# Patient Record
Sex: FEMALE | Race: WHITE
[De-identification: names, ages, dates, MRNs, and addresses within clinical notes are randomized per-mention and may not be internally consistent; named-entity substitution may affect disease eponyms.]

---

## 2019-09-10 ENCOUNTER — HOSPITAL ENCOUNTER (INPATIENT)
Dept: HOSPITAL 63 - GEROPSY | Age: 64
Discharge: NURSING, REHAB OR LONG-TERM CARE | DRG: 885 | End: 2019-09-10
Attending: PSYCHIATRY & NEUROLOGY | Admitting: PSYCHIATRY & NEUROLOGY
Payer: MEDICARE

## 2019-09-10 ENCOUNTER — HOSPITAL ENCOUNTER (INPATIENT)
Dept: HOSPITAL 63 - ICU | Age: 64
LOS: 2 days | Discharge: PSYCHIATRIC OR SUBSTANCE USE FACILITY | DRG: 314 | End: 2019-09-12
Attending: INTERNAL MEDICINE | Admitting: INTERNAL MEDICINE
Payer: MEDICARE

## 2019-09-10 VITALS — HEIGHT: 63 IN | WEIGHT: 95.56 LBS | BODY MASS INDEX: 16.93 KG/M2

## 2019-09-10 VITALS — SYSTOLIC BLOOD PRESSURE: 91 MMHG | DIASTOLIC BLOOD PRESSURE: 66 MMHG

## 2019-09-10 VITALS — SYSTOLIC BLOOD PRESSURE: 79 MMHG | DIASTOLIC BLOOD PRESSURE: 55 MMHG

## 2019-09-10 VITALS — DIASTOLIC BLOOD PRESSURE: 72 MMHG | SYSTOLIC BLOOD PRESSURE: 79 MMHG

## 2019-09-10 VITALS — HEIGHT: 63 IN | BODY MASS INDEX: 16.17 KG/M2 | WEIGHT: 91.25 LBS

## 2019-09-10 DIAGNOSIS — E87.2: ICD-10-CM

## 2019-09-10 DIAGNOSIS — I95.9: Primary | ICD-10-CM

## 2019-09-10 DIAGNOSIS — F41.9: ICD-10-CM

## 2019-09-10 DIAGNOSIS — E86.0: ICD-10-CM

## 2019-09-10 DIAGNOSIS — M87.9: ICD-10-CM

## 2019-09-10 DIAGNOSIS — Z87.891: ICD-10-CM

## 2019-09-10 DIAGNOSIS — Z81.1: ICD-10-CM

## 2019-09-10 DIAGNOSIS — Z79.899: ICD-10-CM

## 2019-09-10 DIAGNOSIS — F29: ICD-10-CM

## 2019-09-10 DIAGNOSIS — E78.5: ICD-10-CM

## 2019-09-10 DIAGNOSIS — E87.6: ICD-10-CM

## 2019-09-10 DIAGNOSIS — N17.9: ICD-10-CM

## 2019-09-10 DIAGNOSIS — F11.10: ICD-10-CM

## 2019-09-10 DIAGNOSIS — F33.2: ICD-10-CM

## 2019-09-10 DIAGNOSIS — Z83.3: ICD-10-CM

## 2019-09-10 DIAGNOSIS — E87.1: ICD-10-CM

## 2019-09-10 DIAGNOSIS — E83.42: ICD-10-CM

## 2019-09-10 DIAGNOSIS — F22: ICD-10-CM

## 2019-09-10 DIAGNOSIS — E43: ICD-10-CM

## 2019-09-10 DIAGNOSIS — G31.84: ICD-10-CM

## 2019-09-10 DIAGNOSIS — Z86.010: ICD-10-CM

## 2019-09-10 DIAGNOSIS — I10: ICD-10-CM

## 2019-09-10 DIAGNOSIS — K21.9: ICD-10-CM

## 2019-09-10 DIAGNOSIS — H40.9: ICD-10-CM

## 2019-09-10 DIAGNOSIS — F09: ICD-10-CM

## 2019-09-10 DIAGNOSIS — J44.9: ICD-10-CM

## 2019-09-10 DIAGNOSIS — Z88.0: ICD-10-CM

## 2019-09-10 DIAGNOSIS — F32.3: Primary | ICD-10-CM

## 2019-09-10 LAB
ALBUMIN SERPL-MCNC: 3.5 G/DL (ref 3.4–5)
ALBUMIN/GLOB SERPL: 1 {RATIO} (ref 1–1.7)
ALP SERPL-CCNC: 79 U/L (ref 46–116)
ALT SERPL-CCNC: 6 U/L (ref 14–59)
ANION GAP SERPL CALC-SCNC: 16 MMOL/L (ref 6–14)
AST SERPL-CCNC: 14 U/L (ref 15–37)
BASOPHILS # BLD AUTO: 0 X10^3/UL (ref 0–0.2)
BASOPHILS NFR BLD: 1 % (ref 0–3)
BILIRUB SERPL-MCNC: 0.6 MG/DL (ref 0.2–1)
BUN/CREAT SERPL: 7 (ref 6–20)
CA-I SERPL ISE-MCNC: 11 MG/DL (ref 7–20)
CALCIUM SERPL-MCNC: 9.2 MG/DL (ref 8.5–10.1)
CHLORIDE SERPL-SCNC: 101 MMOL/L (ref 98–107)
CO2 SERPL-SCNC: 22 MMOL/L (ref 21–32)
CREAT SERPL-MCNC: 1.5 MG/DL (ref 0.6–1)
EOSINOPHIL NFR BLD: 0 X10^3/UL (ref 0–0.7)
EOSINOPHIL NFR BLD: 1 % (ref 0–3)
ERYTHROCYTE [DISTWIDTH] IN BLOOD BY AUTOMATED COUNT: 12.2 % (ref 11.5–14.5)
GFR SERPLBLD BASED ON 1.73 SQ M-ARVRAT: 34.9 ML/MIN
GLOBULIN SER-MCNC: 3.6 G/DL (ref 2.2–3.8)
GLUCOSE SERPL-MCNC: 164 MG/DL (ref 70–99)
HCT VFR BLD CALC: 48.1 % (ref 36–47)
HGB BLD-MCNC: 16 G/DL (ref 12–15.5)
LYMPHOCYTES # BLD: 2 X10^3/UL (ref 1–4.8)
LYMPHOCYTES NFR BLD AUTO: 37 % (ref 24–48)
MCH RBC QN AUTO: 30 PG (ref 25–35)
MCHC RBC AUTO-ENTMCNC: 33 G/DL (ref 31–37)
MCV RBC AUTO: 90 FL (ref 79–100)
MONO #: 0.3 X10^3/UL (ref 0–1.1)
MONOCYTES NFR BLD: 5 % (ref 0–9)
NEUT #: 3 X10^3UL (ref 1.8–7.7)
NEUTROPHILS NFR BLD AUTO: 57 % (ref 31–73)
PLATELET # BLD AUTO: 227 X10^3/UL (ref 140–400)
POTASSIUM SERPL-SCNC: 3.1 MMOL/L (ref 3.5–5.1)
PROT SERPL-MCNC: 7.1 G/DL (ref 6.4–8.2)
RBC # BLD AUTO: 5.33 X10^6/UL (ref 3.5–5.4)
SODIUM SERPL-SCNC: 139 MMOL/L (ref 136–145)
WBC # BLD AUTO: 5.4 X10^3/UL (ref 4–11)

## 2019-09-10 PROCEDURE — 80053 COMPREHEN METABOLIC PANEL: CPT

## 2019-09-10 PROCEDURE — 80048 BASIC METABOLIC PNL TOTAL CA: CPT

## 2019-09-10 PROCEDURE — 85027 COMPLETE CBC AUTOMATED: CPT

## 2019-09-10 PROCEDURE — 85730 THROMBOPLASTIN TIME PARTIAL: CPT

## 2019-09-10 PROCEDURE — 87641 MR-STAPH DNA AMP PROBE: CPT

## 2019-09-10 PROCEDURE — 87040 BLOOD CULTURE FOR BACTERIA: CPT

## 2019-09-10 PROCEDURE — 71045 X-RAY EXAM CHEST 1 VIEW: CPT

## 2019-09-10 PROCEDURE — 81001 URINALYSIS AUTO W/SCOPE: CPT

## 2019-09-10 PROCEDURE — 85610 PROTHROMBIN TIME: CPT

## 2019-09-10 PROCEDURE — 82550 ASSAY OF CK (CPK): CPT

## 2019-09-10 PROCEDURE — 85025 COMPLETE CBC W/AUTO DIFF WBC: CPT

## 2019-09-10 PROCEDURE — 36415 COLL VENOUS BLD VENIPUNCTURE: CPT

## 2019-09-10 PROCEDURE — 83735 ASSAY OF MAGNESIUM: CPT

## 2019-09-10 PROCEDURE — 93005 ELECTROCARDIOGRAM TRACING: CPT

## 2019-09-10 PROCEDURE — 83605 ASSAY OF LACTIC ACID: CPT

## 2019-09-10 NOTE — PDOC
Exam


Note:


Ashish Note:


Please also refer to the separate dictated note~for this date of service 

dictated separately. Discussed the patient with Nursing staff reviewed the 

chart.~Reviewed interim history and current functioning. Reviewed vital 

signs,~Labs/ Radiology~and current medications noted below. Continue current 

treatment with the changes noted in the dictated addendum note





Assessment:


Vital Signs/I&O:





                                   Vital Signs








  Date Time  Temp Pulse Resp B/P (MAP) Pulse Ox O2 Delivery O2 Flow Rate FiO2


 


9/10/19 20:03 97.5 115 20 91/66 (74) 96   








Labs:





                                Laboratory Tests








Test


 9/10/19


20:15


 


White Blood Count


 5.4 x10^3/uL


(4.0-11.0)


 


Red Blood Count


 5.33 x10^6/uL


(3.50-5.40)


 


Hemoglobin


 16.0 g/dL


(12.0-15.5)  H


 


Hematocrit


 48.1 %


(36.0-47.0)  H


 


Mean Corpuscular Volume


 90 fL ()





 


Mean Corpuscular Hemoglobin 30 pg (25-35)  


 


Mean Corpuscular Hemoglobin


Concent 33 g/dL


(31-37)


 


Red Cell Distribution Width


 12.2 %


(11.5-14.5)


 


Platelet Count


 227 x10^3/uL


(140-400)


 


Neutrophils (%) (Auto) 57 % (31-73)  


 


Lymphocytes (%) (Auto) 37 % (24-48)  


 


Monocytes (%) (Auto) 5 % (0-9)  


 


Eosinophils (%) (Auto) 1 % (0-3)  


 


Basophils (%) (Auto) 1 % (0-3)  


 


Neutrophils # (Auto)


 3.0 x10^3uL


(1.8-7.7)


 


Lymphocytes # (Auto)


 2.0 x10^3/uL


(1.0-4.8)


 


Monocytes # (Auto)


 0.3 x10^3/uL


(0.0-1.1)


 


Eosinophils # (Auto)


 0.0 x10^3/uL


(0.0-0.7)


 


Basophils # (Auto)


 0.0 x10^3/uL


(0.0-0.2)


 


Sodium Level


 139 mmol/L


(136-145)


 


Potassium Level


 3.1 mmol/L


(3.5-5.1)  L


 


Chloride Level


 101 mmol/L


()


 


Carbon Dioxide Level


 22 mmol/L


(21-32)


 


Anion Gap 16 (6-14)  H


 


Blood Urea Nitrogen


 11 mg/dL


(7-20)


 


Creatinine


 1.5 mg/dL


(0.6-1.0)  H


 


Estimated GFR


(Cockcroft-Gault) 34.9  





 


BUN/Creatinine Ratio 7 (6-20)  


 


Glucose Level


 164 mg/dL


(70-99)  H


 


Lactic Acid Level


 6.4 mmol/L


(0.4-2.0)  *H


 


Calcium Level


 9.2 mg/dL


(8.5-10.1)


 


Total Bilirubin


 0.6 mg/dL


(0.2-1.0)


 


Aspartate Amino Transferase


(AST) 14 U/L (15-37)


L


 


Alanine Aminotransferase (ALT)


 6 U/L (14-59)


L


 


Alkaline Phosphatase


 79 U/L


()


 


Creatine Kinase


 23 U/L


()  L


 


Total Protein


 7.1 g/dL


(6.4-8.2)


 


Albumin


 3.5 g/dL


(3.4-5.0)


 


Albumin/Globulin Ratio 1.0 (1.0-1.7)  











Current Medications:


I have reviewed the current psychotropics carefully including drug interactions.

 Risk benefit ratio favors no change other than as noted in my dictated progress

note.











TERA JAY MD                 Sep 10, 2019 21:49

## 2019-09-11 VITALS — SYSTOLIC BLOOD PRESSURE: 96 MMHG | DIASTOLIC BLOOD PRESSURE: 43 MMHG

## 2019-09-11 VITALS — DIASTOLIC BLOOD PRESSURE: 64 MMHG | SYSTOLIC BLOOD PRESSURE: 87 MMHG

## 2019-09-11 VITALS — DIASTOLIC BLOOD PRESSURE: 68 MMHG | SYSTOLIC BLOOD PRESSURE: 102 MMHG

## 2019-09-11 VITALS — DIASTOLIC BLOOD PRESSURE: 70 MMHG | SYSTOLIC BLOOD PRESSURE: 106 MMHG

## 2019-09-11 VITALS — SYSTOLIC BLOOD PRESSURE: 67 MMHG | DIASTOLIC BLOOD PRESSURE: 49 MMHG

## 2019-09-11 VITALS — DIASTOLIC BLOOD PRESSURE: 64 MMHG | SYSTOLIC BLOOD PRESSURE: 103 MMHG

## 2019-09-11 VITALS — SYSTOLIC BLOOD PRESSURE: 92 MMHG | DIASTOLIC BLOOD PRESSURE: 62 MMHG

## 2019-09-11 VITALS — DIASTOLIC BLOOD PRESSURE: 67 MMHG | SYSTOLIC BLOOD PRESSURE: 104 MMHG

## 2019-09-11 VITALS — SYSTOLIC BLOOD PRESSURE: 70 MMHG | DIASTOLIC BLOOD PRESSURE: 50 MMHG

## 2019-09-11 VITALS — SYSTOLIC BLOOD PRESSURE: 106 MMHG | DIASTOLIC BLOOD PRESSURE: 70 MMHG

## 2019-09-11 VITALS — DIASTOLIC BLOOD PRESSURE: 65 MMHG | SYSTOLIC BLOOD PRESSURE: 94 MMHG

## 2019-09-11 VITALS — DIASTOLIC BLOOD PRESSURE: 58 MMHG | SYSTOLIC BLOOD PRESSURE: 79 MMHG

## 2019-09-11 VITALS — DIASTOLIC BLOOD PRESSURE: 62 MMHG | SYSTOLIC BLOOD PRESSURE: 92 MMHG

## 2019-09-11 VITALS — SYSTOLIC BLOOD PRESSURE: 83 MMHG | DIASTOLIC BLOOD PRESSURE: 61 MMHG

## 2019-09-11 VITALS — DIASTOLIC BLOOD PRESSURE: 64 MMHG | SYSTOLIC BLOOD PRESSURE: 92 MMHG

## 2019-09-11 VITALS — SYSTOLIC BLOOD PRESSURE: 94 MMHG | DIASTOLIC BLOOD PRESSURE: 65 MMHG

## 2019-09-11 VITALS — DIASTOLIC BLOOD PRESSURE: 64 MMHG | SYSTOLIC BLOOD PRESSURE: 98 MMHG

## 2019-09-11 VITALS — SYSTOLIC BLOOD PRESSURE: 97 MMHG | DIASTOLIC BLOOD PRESSURE: 65 MMHG

## 2019-09-11 VITALS — DIASTOLIC BLOOD PRESSURE: 64 MMHG | SYSTOLIC BLOOD PRESSURE: 96 MMHG

## 2019-09-11 VITALS — SYSTOLIC BLOOD PRESSURE: 112 MMHG | DIASTOLIC BLOOD PRESSURE: 72 MMHG

## 2019-09-11 VITALS — DIASTOLIC BLOOD PRESSURE: 69 MMHG | SYSTOLIC BLOOD PRESSURE: 95 MMHG

## 2019-09-11 VITALS — SYSTOLIC BLOOD PRESSURE: 95 MMHG | DIASTOLIC BLOOD PRESSURE: 62 MMHG

## 2019-09-11 VITALS — DIASTOLIC BLOOD PRESSURE: 61 MMHG | SYSTOLIC BLOOD PRESSURE: 96 MMHG

## 2019-09-11 VITALS — DIASTOLIC BLOOD PRESSURE: 51 MMHG | SYSTOLIC BLOOD PRESSURE: 71 MMHG

## 2019-09-11 VITALS — DIASTOLIC BLOOD PRESSURE: 72 MMHG | SYSTOLIC BLOOD PRESSURE: 111 MMHG

## 2019-09-11 LAB
ALBUMIN SERPL-MCNC: 2.4 G/DL (ref 3.4–5)
ALBUMIN/GLOB SERPL: 0.9 {RATIO} (ref 1–1.7)
ALP SERPL-CCNC: 59 U/L (ref 46–116)
ALT SERPL-CCNC: 9 U/L (ref 14–59)
ANION GAP SERPL CALC-SCNC: 11 MMOL/L (ref 6–14)
APTT PPP: YELLOW S
AST SERPL-CCNC: 15 U/L (ref 15–37)
BACTERIA #/AREA URNS HPF: 0 /HPF
BASOPHILS # BLD AUTO: 0 X10^3/UL (ref 0–0.2)
BASOPHILS NFR BLD: 1 % (ref 0–3)
BILIRUB SERPL-MCNC: 0.2 MG/DL (ref 0.2–1)
BILIRUB UR QL STRIP: (no result)
BUN/CREAT SERPL: 10 (ref 6–20)
CA-I SERPL ISE-MCNC: 9 MG/DL (ref 7–20)
CALCIUM SERPL-MCNC: 7.8 MG/DL (ref 8.5–10.1)
CHLORIDE SERPL-SCNC: 112 MMOL/L (ref 98–107)
CO2 SERPL-SCNC: 20 MMOL/L (ref 21–32)
CREAT SERPL-MCNC: 0.9 MG/DL (ref 0.6–1)
EOSINOPHIL NFR BLD: 0.1 X10^3/UL (ref 0–0.7)
EOSINOPHIL NFR BLD: 1 % (ref 0–3)
ERYTHROCYTE [DISTWIDTH] IN BLOOD BY AUTOMATED COUNT: 12.3 % (ref 11.5–14.5)
FIBRINOGEN PPP-MCNC: CLEAR MG/DL
GFR SERPLBLD BASED ON 1.73 SQ M-ARVRAT: 62.8 ML/MIN
GLOBULIN SER-MCNC: 2.6 G/DL (ref 2.2–3.8)
GLUCOSE SERPL-MCNC: 121 MG/DL (ref 70–99)
GLUCOSE UR STRIP-MCNC: (no result) MG/DL
HCT VFR BLD CALC: 37.4 % (ref 36–47)
HGB BLD-MCNC: 12.6 G/DL (ref 12–15.5)
HYALINE CASTS #/AREA URNS LPF: (no result) /HPF
LYMPHOCYTES # BLD: 2.2 X10^3/UL (ref 1–4.8)
LYMPHOCYTES NFR BLD AUTO: 42 % (ref 24–48)
MAGNESIUM SERPL-MCNC: 1.5 MG/DL (ref 1.8–2.4)
MCH RBC QN AUTO: 30 PG (ref 25–35)
MCHC RBC AUTO-ENTMCNC: 34 G/DL (ref 31–37)
MCV RBC AUTO: 89 FL (ref 79–100)
MONO #: 0.4 X10^3/UL (ref 0–1.1)
MONOCYTES NFR BLD: 7 % (ref 0–9)
NEUT #: 2.6 X10^3UL (ref 1.8–7.7)
NEUTROPHILS NFR BLD AUTO: 50 % (ref 31–73)
NITRITE UR QL STRIP: (no result)
PLATELET # BLD AUTO: 238 X10^3/UL (ref 140–400)
POTASSIUM SERPL-SCNC: 3.1 MMOL/L (ref 3.5–5.1)
PROT SERPL-MCNC: 5 G/DL (ref 6.4–8.2)
RBC # BLD AUTO: 4.22 X10^6/UL (ref 3.5–5.4)
RBC #/AREA URNS HPF: (no result) /HPF (ref 0–2)
SODIUM SERPL-SCNC: 143 MMOL/L (ref 136–145)
SP GR UR STRIP: 1.01
SQUAMOUS #/AREA URNS LPF: (no result) /LPF
UROBILINOGEN UR-MCNC: 0.2 MG/DL
WBC # BLD AUTO: 5.3 X10^3/UL (ref 4–11)
WBC #/AREA URNS HPF: (no result) /HPF (ref 0–4)

## 2019-09-11 RX ADMIN — SODIUM CHLORIDE SCH MLS/HR: 0.9 INJECTION, SOLUTION INTRAVENOUS at 04:18

## 2019-09-11 RX ADMIN — DEXTROSE, SODIUM CHLORIDE, AND POTASSIUM CHLORIDE SCH MLS/HR: 5; .45; .3 INJECTION INTRAVENOUS at 17:35

## 2019-09-11 RX ADMIN — SODIUM CHLORIDE SCH MLS/HR: 0.9 INJECTION, SOLUTION INTRAVENOUS at 00:15

## 2019-09-11 RX ADMIN — SODIUM CHLORIDE SCH MLS/HR: 0.9 INJECTION, SOLUTION INTRAVENOUS at 14:28

## 2019-09-11 NOTE — HP
ADMIT DATE:  09/10/2019



ADMISSION NOTE/DISCHARGE SUMMARY



This is a late entry 09/10/2019 covers the elements not covered in my initial

note 09/10/2019.



IDENTIFYING DATA:  The patient is a 63-year-old  female referred to us

from the hospital in [facility] by Dr. Rudolph Mcneill, her physician at the

facility and by her primary care physician, Dr. Rasheed Allen on account of

worsening symptoms of depression, self-neglect, not eating, drinking, more

confused in the evening with sundowning, not taking her medications, striking

out at her spouse, agitated, anxious about medications, paranoid.  She had

failed inpatient psychiatric interventions at [facility] in [place] and

outpatient treatment and family arranged for her to be taken to the Emergency

Room at that facility and then hospitalized medically stabilized and then

referred to us on account of her ongoing symptoms of depression with significant

potential for harm to herself due to her neglect, paranoia, refusal, worsening

confusion, and failure for outpatient treatment.  She presented to our unit

evening of 09/10/2019, was found to be medically compromised with significant

tachycardia, blood pressure changes, elevated lactate, and was transferred by

Dr. Rudolph to [address] [place] [address] for further medical stabilization.  I did not get to see the

patient since she was on our unit for a very brief period of time late evening

of 09/10/2019, but would be happy to consult on her on the medical/surgical

floor, [address] [place] [address] if requested.  I have reviewed records from [facility] and

the intake and information discussed with Jennifer Bravo and nursing staff

several times during the day on 09/10/2019 as part of this admission.



FINAL DIAGNOSES:  From a psychiatric standpoint, major depressive disorder, mild

cognitive impairment, psychotic disorder, unspecified.  Rest includes her

hyperlipidemia, GERD, chronic constipation.  She has had prior psychiatric

hospitalization to [facility] for narcotic overuse discharge plans.  The

patient was transitioned to [address] [place] [address] Medical Surgical Floor for stabilization per

Dr. Rudolph.  This is admission note/discharge summary combination on this patient.





______________________________

TERA JAY MD



DR:  JAYLEEN/ted  JOB#:  [id number] / [id number]

DD:  09/11/2019 13:18  DT:  09/11/2019 13:29

## 2019-09-11 NOTE — RAD
PORTABLE CHEST 1V

 

History: Possible sepsis

 

Comparison: None.

 

Findings:

Single view of the chest is submitted. 

There is no infiltrate, pneumothorax, or effusion. The pericardial cardiac

silhouette is within normal limits in size. There is atherosclerotic 

calcification near the aortic arch. There are thoracic spinal stimulator 

leads terminating near the T9 level. There is lumbar spinal hardware not 

fully evaluated.

 

Impression: 

 

1.  There is no radiographic evidence of acute cardiopulmonary disease.

 

Electronically signed by: Michael Jones MD (9/11/2019 10:34 AM) 

[facility]

## 2019-09-11 NOTE — NUR
spoke with Dr. Rudolph with update on pt's status.  Dr. Rudolph is requesting a repeat lactic acid 
this am and also a cxr.

## 2019-09-11 NOTE — HP
ADMIT DATE:  09/10/2019



HISTORY OF PRESENT ILLNESS:  The patient is a 63-year-old  female

patient referred [facility] from a hospital in [place], [place] by

her physician at that facility and per primary care physician on account of

worsening symptoms of depression, self-neglect, not eating, drinking, more

confused in the evening with sundowning, not taking her medication, striking out

at her spouse, agitated, anxious about medication, paranoid.  She had failed

inpatient psychiatric intervention at [facility] in [place] and

outpatient treatment and family arranged for her to be taken to the Emergency

Room at that facility and then hospitalized, medically stabilized and then

referred to the [facility] on account of her ongoing symptoms of

depression with significant potential for harm to herself due to her neglect

paranoia, refusal to eat and drink worsening confusion and failure of outpatient

treatment.  She presented on 09/10/2019, found to be medically compromised.  She

was hypotensive, tachycardic with a markedly elevated lactic acid and therefore

the patient was transferred to [address] [place] [address], ____ to the ICU for further

stabilization where she was treated with IV fluid and started on IV Levophed.



PAST MEDICAL HISTORY:  Significant for glaucoma, hyperlipidemia,

gastroesophageal reflux disease, depression, chronic constipation.  She is also

known to have other medical problems to include avascular necrosis of the left

hip, interstitial cystitis, hyponatremia, chronic obstructive pulmonary disease,

colon polyps.



PAST SURGICAL HISTORY:  Significant for colonoscopy.



FAMILY HISTORY:  Significant for diabetes in her mother, high blood pressure in

her father, alcohol abuse in her father, hypercholesterolemia in both mother and

father.



SOCIAL HISTORY:  She is  and lives with her .  She is a former

smoker.  She has been abusing oxycodone and fentanyl, but does not drink alcohol

or use any recreational drugs.



ALLERGIES:  She is allergic to PENICILLIN.



MEDICATIONS:  She is currently on following medications:  She is on Tylenol 650

mg every 4 hours, mirtazapine 15 mg daily.  She is on sertraline 100 mg daily,

trazodone 50 mg at bedtime and Protonix 40 mg daily.



REVIEW OF SYSTEMS:  As per history of present illness.



PHYSICAL EXAMINATION:

GENERAL:  When she arrived to the [facility], the patient was not

seen to be well, but there was no pallor, jaundice, cyanosis or thyromegaly.  No

jugular venous distention.  No lower limb edema.

VITAL SIGNS:  Her heart rate was 115, blood pressure was 90/66, temperature was

97, respiratory was 20 and oxygen saturation was 96%.

HEAD, EYES, EARS, NOSE AND THROAT:  Showed normocephalic, atraumatic.

NECK:  Supple.

HEART:  Showed normal first and second heart sounds with no gallop or murmur.

CHEST:  Clear to auscultation.  No crepitation or rhonchi.

ABDOMEN:  Distended, soft, nontender.

NEUROLOGIC:  She was awake, alert, responding appropriately.  All cranial nerves

intact.

EXTREMITIES:  She moves extremities without difficulty.  She ambulates without

assistance or assistive devices.



LABORATORY DATA:  On arrival showed a serum sodium 139, potassium 3.1, chloride

101, bicarbonate 22, anion gap of 16, BUN 11, creatinine 1.5, estimated GFR was

35 mL per minute.  Her glucose 164, lactic acid was 6.4, calcium was 9.2.  Total

bilirubin, AST, ALT, alkaline phosphatase were normal.  Total protein was 7.1,

albumin 3.5, white cell count was 5400, hemoglobin 16, hematocrit 48, MCV 90 and

platelet count of 227,000 ____.



ASSESSMENT AND PLAN:  The patient was transferred to [address] [place] [address] ____ ICU with

diagnosis of hypotension and lactic acidosis.  The patient did not have any

leukocytosis and we ordered obviously a chest x-ray, UA and started on IV fluid

and IV Levophed to maintain mean arterial pressure of about 65.  We will follow

her lab work as well as lactic acid, and was started on antibiotic if there is

any source of infection.  In summary, this 63-year-old was transferred from the

unit with ____, lactic acidosis and also acute kidney injury.  Other medical

problems include hyperlipidemia, hypertension, hyponatremia, chronic obstructive

pulmonary disease, anxiety, depression, bleeding tendency, avascular necrosis of

her left hip and degenerative disk disease.  We will obviously order a chest

x-ray, UA and if there is any evidence of infection, we will try to treat her

aggressively for that.





______________________________

JENNIFER YATES MD



DR:  PHILIPPE/ted  JOB#:  [id number] / [id number]

DD:  09/11/2019 17:10  DT:  09/11/2019 18:51

## 2019-09-11 NOTE — PDOC
Exam


Note:


Ashish Note:


Please also refer to the separate dictated note~for this date of service 

dictated separately.~Patient seen individually. Discussed the patient with 

Nursing staff reviewed the chart.~Reviewed interim history and current 

functioning. Reviewed vital signs,~Labs/ Radiology~and current medications noted

below. Continue current treatment with the changes noted in the dictated 

addendum note





Assessment:


Vital Signs/I&O:





                                   Vital Signs








  Date Time  Temp Pulse Resp B/P (MAP) Pulse Ox O2 Delivery O2 Flow Rate FiO2


 


9/11/19 20:54  87 21 104/67 (79)  Room Air  


 


9/11/19 18:19 98.3    99   














                                    I & O   


 


 9/10/19 9/10/19 9/11/19





 14:59 22:59 06:59


 


Intake Total   2710 ml


 


Output Total   500 ml


 


Balance   2210 ml








Labs:





                                Laboratory Tests








Test


 9/10/19


22:30 9/11/19


00:15 9/11/19


05:55 9/11/19


08:19


 


Prothrombin Time


 11.5 SEC


(9.4-11.4)  H 


 


 





 


Prothrombin Time INR 1.1 (0.9-1.1)     


 


Activated Partial


Thromboplast Time 26 SEC (23-33)


 


 


 





 


Nasal Screen MRSA (PCR)


 Negative


(Negative) 


 


 





 


Magnesium Level


 1.8 mg/dL


(1.8-2.4) 


 1.5 mg/dL


(1.8-2.4)  L 





 


Lactic Acid Level


 


 3.0 mmol/L


(0.4-2.0)  H 


 1.0 mmol/L


(0.4-2.0)


 


White Blood Count


 


 


 5.3 x10^3/uL


(4.0-11.0) 





 


Red Blood Count


 


 


 4.22 x10^6/uL


(3.50-5.40) 





 


Hemoglobin


 


 


 12.6 g/dL


(12.0-15.5) 





 


Hematocrit


 


 


 37.4 %


(36.0-47.0) 





 


Mean Corpuscular Volume


 


 


 89 fL ()


 





 


Mean Corpuscular Hemoglobin   30 pg (25-35)   


 


Mean Corpuscular Hemoglobin


Concent 


 


 34 g/dL


(31-37) 





 


Red Cell Distribution Width


 


 


 12.3 %


(11.5-14.5) 





 


Platelet Count


 


 


 238 x10^3/uL


(140-400) 





 


Neutrophils (%) (Auto)   50 % (31-73)   


 


Lymphocytes (%) (Auto)   42 % (24-48)   


 


Monocytes (%) (Auto)   7 % (0-9)   


 


Eosinophils (%) (Auto)   1 % (0-3)   


 


Basophils (%) (Auto)   1 % (0-3)   


 


Neutrophils # (Auto)


 


 


 2.6 x10^3uL


(1.8-7.7) 





 


Lymphocytes # (Auto)


 


 


 2.2 x10^3/uL


(1.0-4.8) 





 


Monocytes # (Auto)


 


 


 0.4 x10^3/uL


(0.0-1.1) 





 


Eosinophils # (Auto)


 


 


 0.1 x10^3/uL


(0.0-0.7) 





 


Basophils # (Auto)


 


 


 0.0 x10^3/uL


(0.0-0.2) 





 


Sodium Level


 


 


 143 mmol/L


(136-145) 





 


Potassium Level


 


 


 3.1 mmol/L


(3.5-5.1)  L 





 


Chloride Level


 


 


 112 mmol/L


()  H 





 


Carbon Dioxide Level


 


 


 20 mmol/L


(21-32)  L 





 


Anion Gap   11 (6-14)   


 


Blood Urea Nitrogen


 


 


 9 mg/dL (7-20)


 





 


Creatinine


 


 


 0.9 mg/dL


(0.6-1.0) 





 


Estimated GFR


(Cockcroft-Gault) 


 


 62.8  


 





 


BUN/Creatinine Ratio   10 (6-20)   


 


Glucose Level


 


 


 121 mg/dL


(70-99)  H 





 


Calcium Level


 


 


 7.8 mg/dL


(8.5-10.1)  L 





 


Total Bilirubin


 


 


 0.2 mg/dL


(0.2-1.0) 





 


Aspartate Amino Transferase


(AST) 


 


 15 U/L (15-37)


 





 


Alanine Aminotransferase (ALT)


 


 


 9 U/L (14-59)


L 





 


Alkaline Phosphatase


 


 


 59 U/L


() 





 


Total Protein


 


 


 5.0 g/dL


(6.4-8.2)  L 





 


Albumin


 


 


 2.4 g/dL


(3.4-5.0)  L 





 


Albumin/Globulin Ratio


 


 


 0.9 (1.0-1.7)


L 





 


Test


 9/11/19


14:31 


 


 





 


Urine Collection Type Unknown     


 


Urine Color Yellow     


 


Urine Clarity Clear     


 


Urine pH 5.5     


 


Urine Specific Gravity 1.010     


 


Urine Protein


 Neg


(NEG-TRACE) 


 


 





 


Urine Glucose (UA)


 Neg mg/dL


(NEG) 


 


 





 


Urine Ketones (Stick)


 Neg mg/dL


(NEG) 


 


 





 


Urine Blood Trace (NEG)     


 


Urine Nitrite Neg (NEG)     


 


Urine Bilirubin Neg (NEG)     


 


Urine Urobilinogen Dipstick


 0.2 mg/dL (0.2


mg/dL) 


 


 





 


Urine Leukocyte Esterase Neg (NEG)     


 


Urine RBC


 Rare /HPF


(0-2) 


 


 





 


Urine WBC


 1-4 /HPF (0-4)


 


 


 





 


Urine Squamous Epithelial


Cells Mod /LPF  


 


 


 





 


Urine Transitional Epithelial


Cells Occ /LPF  


 


 


 





 


Urine Bacteria


 0 /HPF (0-FEW)


 


 


 





 


Urine Hyaline Casts Occ /HPF     


 


Urine Mucus Slight /LPF     











Current Medications:


Meds:





Current Medications








 Medications


  (Trade)  Dose


 Ordered  Sig/Huseyin


 Route


 PRN Reason  Start Time


 Stop Time Status Last Admin


Dose Admin


 


 Sodium Chloride  1,710 ml @ 


 1,710 mls/hr  Q1H


 IV


   9/11/19 00:15


 9/11/19 01:14 DC 9/11/19 00:43





 


 Sodium Chloride  1,000 ml @ 


 150 mls/hr  Q6H40M


 IV


   9/11/19 00:15


 9/11/19 17:23 DC 9/11/19 14:28





 


 Norepinephrine


 Bitartrate 8 mg/


 Sodium Chloride  258 ml @ 


 8.387 mls/


 hr  CONT  PRN


 IV


 SEE I/O RECORD  9/11/19 02:00


    9/11/19 02:37





 


 Magnesium Sulfate  50 ml @ 25


 mls/hr  1X  ONCE


 IV


   9/11/19 08:30


 9/11/19 10:29 DC 9/11/19 08:46





 


 Potassium Chloride


  (Klor-Con)  40 meq  1X  ONCE


 PO


   9/11/19 08:30


 9/11/19 08:31 DC 9/11/19 08:46





 


 Potassium


 Chloride/Dextrose/


 Sod Cl  1,000 ml @ 


 100 mls/hr  Q10H


 IV


   9/11/19 17:30


    9/11/19 17:35











I have reviewed the current psychotropics carefully including drug interactions.

 Risk benefit ratio favors no change other than as noted in my dictated progress

note.





Diagnosis:


Problems:  


(1) Anxiety disorder


(2) Major depressive disorder, recurrent episode


(3) Mild cognitive disorder











TERA JAY MD                 Sep 11, 2019 21:01

## 2019-09-11 NOTE — NUR
Kay Mcgee a 62 y/o female was admitted from [facility], Dr. Rudolph, inpatient ICU status.  Pt 
had been a direct admit to [facility] and was found to have hypotension, elevated lactic, failure 
to thrive, unable to void today.  Patient was settled into med, connected to monitors, IV 
started, IVF started, blood cultures x 2 were drawn, admission assessment and process 
completed.  



Pt states she has been primarily laying in bed at home, not eating or drinking for days. She 
has been neglecting herself, pt has been not been keeping herself clean, she has stopped 
taking all her medications.  



WCTM as labs return.

## 2019-09-11 NOTE — NUR
despite IVF bolus, pt continues to be hypotensive with a MAP <65.  Repeat lactic acid has 
decreased to 3.0.  Pt is still unable to urinate.  Received orders for Levophed drip to 
titrate to maintain systolic BP greater than 90.  Pt is resting comfortably at this time.

## 2019-09-12 ENCOUNTER — HOSPITAL ENCOUNTER (INPATIENT)
Dept: HOSPITAL 63 - GEROPSY | Age: 64
LOS: 23 days | Discharge: HOME HEALTH | DRG: 885 | End: 2019-10-05
Attending: PSYCHIATRY & NEUROLOGY | Admitting: PSYCHIATRY & NEUROLOGY
Payer: MEDICARE

## 2019-09-12 VITALS — DIASTOLIC BLOOD PRESSURE: 66 MMHG | SYSTOLIC BLOOD PRESSURE: 91 MMHG

## 2019-09-12 VITALS — DIASTOLIC BLOOD PRESSURE: 68 MMHG | SYSTOLIC BLOOD PRESSURE: 112 MMHG

## 2019-09-12 VITALS — SYSTOLIC BLOOD PRESSURE: 108 MMHG | DIASTOLIC BLOOD PRESSURE: 69 MMHG

## 2019-09-12 VITALS — SYSTOLIC BLOOD PRESSURE: 90 MMHG | DIASTOLIC BLOOD PRESSURE: 59 MMHG

## 2019-09-12 VITALS — DIASTOLIC BLOOD PRESSURE: 67 MMHG | SYSTOLIC BLOOD PRESSURE: 94 MMHG

## 2019-09-12 VITALS — DIASTOLIC BLOOD PRESSURE: 70 MMHG | SYSTOLIC BLOOD PRESSURE: 107 MMHG

## 2019-09-12 VITALS — DIASTOLIC BLOOD PRESSURE: 66 MMHG | SYSTOLIC BLOOD PRESSURE: 93 MMHG

## 2019-09-12 VITALS — DIASTOLIC BLOOD PRESSURE: 64 MMHG | SYSTOLIC BLOOD PRESSURE: 85 MMHG

## 2019-09-12 VITALS — SYSTOLIC BLOOD PRESSURE: 84 MMHG | DIASTOLIC BLOOD PRESSURE: 62 MMHG

## 2019-09-12 VITALS — DIASTOLIC BLOOD PRESSURE: 64 MMHG | SYSTOLIC BLOOD PRESSURE: 99 MMHG

## 2019-09-12 VITALS — DIASTOLIC BLOOD PRESSURE: 64 MMHG | SYSTOLIC BLOOD PRESSURE: 84 MMHG

## 2019-09-12 VITALS — SYSTOLIC BLOOD PRESSURE: 108 MMHG | DIASTOLIC BLOOD PRESSURE: 65 MMHG

## 2019-09-12 VITALS — DIASTOLIC BLOOD PRESSURE: 65 MMHG | SYSTOLIC BLOOD PRESSURE: 100 MMHG

## 2019-09-12 VITALS — DIASTOLIC BLOOD PRESSURE: 57 MMHG | SYSTOLIC BLOOD PRESSURE: 79 MMHG

## 2019-09-12 VITALS — SYSTOLIC BLOOD PRESSURE: 107 MMHG | DIASTOLIC BLOOD PRESSURE: 70 MMHG

## 2019-09-12 VITALS — DIASTOLIC BLOOD PRESSURE: 69 MMHG | SYSTOLIC BLOOD PRESSURE: 104 MMHG

## 2019-09-12 VITALS — SYSTOLIC BLOOD PRESSURE: 103 MMHG | DIASTOLIC BLOOD PRESSURE: 64 MMHG

## 2019-09-12 VITALS — DIASTOLIC BLOOD PRESSURE: 60 MMHG | SYSTOLIC BLOOD PRESSURE: 85 MMHG

## 2019-09-12 VITALS — BODY MASS INDEX: 18.5 KG/M2 | HEIGHT: 63 IN | WEIGHT: 104.38 LBS

## 2019-09-12 VITALS — SYSTOLIC BLOOD PRESSURE: 85 MMHG | DIASTOLIC BLOOD PRESSURE: 61 MMHG

## 2019-09-12 DIAGNOSIS — G31.84: ICD-10-CM

## 2019-09-12 DIAGNOSIS — E78.5: ICD-10-CM

## 2019-09-12 DIAGNOSIS — J44.9: ICD-10-CM

## 2019-09-12 DIAGNOSIS — E83.42: ICD-10-CM

## 2019-09-12 DIAGNOSIS — F09: ICD-10-CM

## 2019-09-12 DIAGNOSIS — N17.9: ICD-10-CM

## 2019-09-12 DIAGNOSIS — F11.20: ICD-10-CM

## 2019-09-12 DIAGNOSIS — F33.3: Primary | ICD-10-CM

## 2019-09-12 DIAGNOSIS — Z88.0: ICD-10-CM

## 2019-09-12 DIAGNOSIS — R45.851: ICD-10-CM

## 2019-09-12 DIAGNOSIS — K21.9: ICD-10-CM

## 2019-09-12 DIAGNOSIS — M19.90: ICD-10-CM

## 2019-09-12 DIAGNOSIS — F41.9: ICD-10-CM

## 2019-09-12 DIAGNOSIS — E87.6: ICD-10-CM

## 2019-09-12 DIAGNOSIS — Z79.899: ICD-10-CM

## 2019-09-12 DIAGNOSIS — E86.0: ICD-10-CM

## 2019-09-12 DIAGNOSIS — F42.9: ICD-10-CM

## 2019-09-12 DIAGNOSIS — E87.2: ICD-10-CM

## 2019-09-12 LAB
ANION GAP SERPL CALC-SCNC: 9 MMOL/L (ref 6–14)
CA-I SERPL ISE-MCNC: 10 MG/DL (ref 7–20)
CALCIUM SERPL-MCNC: 7.9 MG/DL (ref 8.5–10.1)
CHLORIDE SERPL-SCNC: 113 MMOL/L (ref 98–107)
CO2 SERPL-SCNC: 21 MMOL/L (ref 21–32)
CREAT SERPL-MCNC: 0.7 MG/DL (ref 0.6–1)
ERYTHROCYTE [DISTWIDTH] IN BLOOD BY AUTOMATED COUNT: 12.4 % (ref 11.5–14.5)
GFR SERPLBLD BASED ON 1.73 SQ M-ARVRAT: 84 ML/MIN
GLUCOSE SERPL-MCNC: 112 MG/DL (ref 70–99)
HCT VFR BLD CALC: 37.7 % (ref 36–47)
HGB BLD-MCNC: 12.5 G/DL (ref 12–15.5)
MAGNESIUM SERPL-MCNC: 1.8 MG/DL (ref 1.8–2.4)
MCH RBC QN AUTO: 30 PG (ref 25–35)
MCHC RBC AUTO-ENTMCNC: 33 G/DL (ref 31–37)
MCV RBC AUTO: 90 FL (ref 79–100)
PLATELET # BLD AUTO: 182 X10^3/UL (ref 140–400)
POTASSIUM SERPL-SCNC: 4.4 MMOL/L (ref 3.5–5.1)
RBC # BLD AUTO: 4.19 X10^6/UL (ref 3.5–5.4)
SODIUM SERPL-SCNC: 143 MMOL/L (ref 136–145)
WBC # BLD AUTO: 4.4 X10^3/UL (ref 4–11)

## 2019-09-12 PROCEDURE — 70450 CT HEAD/BRAIN W/O DYE: CPT

## 2019-09-12 PROCEDURE — 90686 IIV4 VACC NO PRSV 0.5 ML IM: CPT

## 2019-09-12 PROCEDURE — 83550 IRON BINDING TEST: CPT

## 2019-09-12 PROCEDURE — 83735 ASSAY OF MAGNESIUM: CPT

## 2019-09-12 PROCEDURE — 86592 SYPHILIS TEST NON-TREP QUAL: CPT

## 2019-09-12 PROCEDURE — 85025 COMPLETE CBC W/AUTO DIFF WBC: CPT

## 2019-09-12 PROCEDURE — 90471 IMMUNIZATION ADMIN: CPT

## 2019-09-12 PROCEDURE — 82607 VITAMIN B-12: CPT

## 2019-09-12 PROCEDURE — 84436 ASSAY OF TOTAL THYROXINE: CPT

## 2019-09-12 PROCEDURE — 83540 ASSAY OF IRON: CPT

## 2019-09-12 PROCEDURE — 82306 VITAMIN D 25 HYDROXY: CPT

## 2019-09-12 PROCEDURE — 83036 HEMOGLOBIN GLYCOSYLATED A1C: CPT

## 2019-09-12 PROCEDURE — 36415 COLL VENOUS BLD VENIPUNCTURE: CPT

## 2019-09-12 PROCEDURE — 93005 ELECTROCARDIOGRAM TRACING: CPT

## 2019-09-12 PROCEDURE — 80053 COMPREHEN METABOLIC PANEL: CPT

## 2019-09-12 PROCEDURE — 84480 ASSAY TRIIODOTHYRONINE (T3): CPT

## 2019-09-12 PROCEDURE — 80061 LIPID PANEL: CPT

## 2019-09-12 RX ADMIN — DEXTROSE, SODIUM CHLORIDE, AND POTASSIUM CHLORIDE SCH MLS/HR: 5; .45; .3 INJECTION INTRAVENOUS at 13:30

## 2019-09-12 RX ADMIN — DEXTROSE, SODIUM CHLORIDE, AND POTASSIUM CHLORIDE SCH MLS/HR: 5; .45; .3 INJECTION INTRAVENOUS at 05:40

## 2019-09-12 NOTE — NUR
Discharge Note:



JESENIA SPIVEY



Discharge instructions and discharge home medications reviewed with KEM BOO RN and a 
copy given. All questions have been answered and understanding verbalized. 



The following instructions and handouts were given: DC PACKET, MED REC GIVEN TO NIGEL BOO. 




Discontinued lines and drains: Peripheral IV REMOVED, CATHETER TIP intact.



Patient discharged to [facility] with HOSPITAL STAFF via Wheelchair. ALL 
BELONGINGS SENT WITH PATIENT.

## 2019-09-12 NOTE — EKG
[facility] [address], [place] [address]

Test Date:    2019-09-10               Test Time:    20:38:37

Pat Name:     JESENIA SPIVEY         Department:   

Patient ID:   [id number]           Room:         [address] [place] [address]

Gender:       F                        Technician:   

:          [id number]               Requested By: TERA JAY

Order Number: [id number]            Reading MD:     

                                 Measurements

Intervals                              Axis          

Rate:         102                      P:            52

WI:           160                      QRS:          64

QRSD:         94                       T:            71

QT:           348                                    

QTc:          458                                    

                           Interpretive Statements

SINUS TACHYCARDIA

OTHERWISE NORMAL ECG

RI6.02

No previous ECG available for comparison

## 2019-09-12 NOTE — NUR
screen placed for [facility]. patients blood pressure remain low when asleep but when awakened and 
sitting up patient blood pressure elevates to within normal limits. Dr Rudolph aware and states 
that is okay. Patient labs within normal limits. So far patient refuses to work with staff  
and wants to be left alone. Patient reported that she was incontinent of urine and asked 
nurse how she was going to get changed. nurse encouraged patient to sit in chair to cleanse 
self while nurse was changing bed, patient stated repeatedly "i cant do it, i cant do it."

## 2019-09-12 NOTE — PN
DATE:  09/11/2019



SUBJECTIVE:  The patient is resting, slightly propped up in bed, in no apparent

respiratory distress.  She denied any complaint; however, nursing staff stated

her urine continued to be very dark and cloudy, has had urinalysis negative for

nitrite and leukocyte esterase.  There were rare rbc's, 1-4 wbc's, no bacteria,

and her chest x-ray showed no radiographic evidence of acute cardiopulmonary

disease.



PHYSICAL EXAMINATION:

GENERAL:  When I examined her this afternoon, she looked well and was clearly in

no apparent respiratory distress.  No pallor, jaundice, cyanosis or thyromegaly.

 No jugular venous distention.  No limb edema.

VITAL SIGNS:  Her heart rate was 86, blood pressure was 96/64, temperature was

98.4, respiratory rate 21 and oxygen saturation was 98% on room air.

HEAD, EYES, EARS, NOSE AND THROAT:  Showed normocephalic, atraumatic.

NECK:  Supple.

HEART:  Showed normal first and second heart sounds.  No gallop, rub or murmur.

CHEST:  Clear to auscultation.  No crepitation or rhonchi.

ABDOMEN:  Distended, soft, nontender.

NEUROLOGIC:  She is awake, alert, responding appropriately.  All cranial nerves

intact.  Moves extremities without difficulty.



Her intake was 2700, output was 500.



LABORATORY DATA:  Her lab work this morning showed a white cell count 5300,

hemoglobin down to 12.6, hematocrit 37.4, MCV 89 and platelet count 238,000. 

Her chemistry showed a serum sodium of 143, potassium 3.1, chloride 112,

bicarbonate 20, anion gap of 11, BUN 9, creatinine 0.9, estimated GFR was 63 mL

per minute.  Her glucose was 121.  Lactic acid came down to 1.  Her calcium was

7.8, magnesium was 1.5.  Total bilirubin, AST, ALT, alkaline phosphatase were

normal.  Total protein was 5, albumin 2.4.



ASSESSMENT:  In summary, this is a 63-year-old  female patient with

severe dehydration, hypotension, lactic acidosis.  She also has mild hypokalemia

and hypomagnesemia.



PLAN:  My plan is to start her on D5 half normal with 40 mEq of potassium

chloride as well as replenish her magnesium, and obviously, if she spikes a

temperature or there is evidence of infection, we will treat it aggressively.





______________________________

JENNIFER YATES MD



DR:  Ned  JOB#:  [id number] / [id number]

DD:  09/11/2019 17:21  DT:  09/12/2019 01:59

## 2019-09-12 NOTE — NUR
Spoke with john from [facility] and dr mclain, plan was to discharge at shift change to [facility]. 
Patient aware of plan and agrees to go to unit. IV removed. Around super time patient asked 
if she could eat supper and ate 100% of meal and ensure. Also asked if she could go to the 
restroom, patient was able to stand and use restroom without assistance. Throughout this 
shift patient has remained incontinent of urine and refusing to eat meals. 

Dr Mcleod here at this time to speak with patient. Plan is to discharge to [facility] tonight

## 2019-09-12 NOTE — CONS
DATE OF CONSULTATION:  09/11/2019



PSYCHIATRIC CONSULTATION



This late entry 09/11/2019 covers elements not covered in my initial note.



I met with the patient evening of 09/11/2019 in ICU bed #3.  Discussed with

nursing staff, reviewed the chart.



IDENTIFYING DATA:  The patient is a 63-year-old  female seen in ICU bed

#3, referred by Dr. Rudolph on account of symptoms of depression.  She was

initially admitted to the [facility] from the [facility] for worsening symptoms of depression, needing to failure to eat or

drink, apathy of motivation and failure for outpatient psychiatric

interventions.  She arrived on the [facility] and was found

to be extremely tachycardic with elevated lactic acid and transferred to the

same day down to the ICU.  I have been asked to consult her from a psychiatric

standpoint.



CHIEF COMPLAINT:  "Yes, I have been depressed."



HISTORY OF PRESENT ILLNESS:  The patient has a history of worsening symptoms of

depression, feeling hopeless, helpless, and worthless.  She has been isolative,

living at home with her , not eating or drinking, getting progressively

dehydrated.  She had failed a prior hospitalization at [facility] for narcotic

addiction and a prior inpatient psychiatric hospitalization.  No clear symptoms

of bipolar disorder.  Initially, she stated she had no reason for the

depression, but on further inquiry, reportedly son the only child hung himself

while living with them in the home about 3 years ago.  The patient reluctantly

admitted that she has been ruminating about this significantly worsening of mood

symptoms.



PAST PSYCHIATRIC HISTORY:  As above.



MEDICAL HISTORY:  Positive for severe dehydration, hypotension, lactic acidosis,

mild hypokalemia, hypomagnesemia.  She has a history of left hip avascular

necrosis.



CURRENT PSYCHOTROPICS:  Zoloft 100 mg a day, trazodone 50 mg at bedtime, Remeron

15 mg daily.



ALLERGIES:  PENICILLIN.



CODE STATUS:  Full code.



FAMILY HISTORY:  Noncontributory.



SOCIAL HISTORY:  The patient lives at home with her .  No alcohol abuse

history, but the overuse of narcotics in the past consequent to pain.



MENTAL STATUS EXAMINATION:  The patient was seen individually evening of

09/11/2019.  She is oriented reasonably.  Speech has some latency, low in rate

and rhythm, low in volume, often responses monosyllabic.  Abstraction fair,

computation impaired, language function intact.  Mood and affect is depressed. 

She denies active suicidal ideation.



LABORATORY DATA:  Reviewed.



IMPRESSION:  Major depressive disorder, recurrent with possible psychotic

features; anxiety disorder, unspecified; impulse control disorder.  Rest

diagnoses as above.



PLAN:  From a psychiatric standpoint, I would not recommend any changes for now.

 Once the patient is medically stable, continue Zoloft at current dosage.  Once

the patient is medically stable, we will transfer to the [facility].



Dr. Rudolph, thank you for the opportunity to participate in your patient's care. 

We will follow with you.





______________________________

TERA JAY MD



DR:  JAYLEEN/ted  JOB#:  [id number] / [id number]

DD:  09/12/2019 18:02  DT:  09/12/2019 19:30

## 2019-09-13 VITALS — SYSTOLIC BLOOD PRESSURE: 94 MMHG | DIASTOLIC BLOOD PRESSURE: 59 MMHG

## 2019-09-13 VITALS — DIASTOLIC BLOOD PRESSURE: 71 MMHG | SYSTOLIC BLOOD PRESSURE: 104 MMHG

## 2019-09-13 VITALS — SYSTOLIC BLOOD PRESSURE: 93 MMHG | DIASTOLIC BLOOD PRESSURE: 64 MMHG

## 2019-09-13 LAB
CHOLEST/HDLC SERPL: 5 {RATIO}
HDLC SERPL-MCNC: 26 MG/DL (ref 40–60)
LDLC: 88 MG/DL (ref 0–100)
T3 SERPL-MCNC: 60 NG/DL (ref 71–180)
T4 SERPL-MCNC: 5.2 UG/DL (ref 4.5–12)
TRIGL SERPL-MCNC: 94 MG/DL (ref 0–150)
VLDLC: 18 MG/DL (ref 0–40)

## 2019-09-13 RX ADMIN — MIRTAZAPINE SCH MG: 15 TABLET, FILM COATED ORAL at 08:24

## 2019-09-13 RX ADMIN — CHOLECALCIFEROL CAP 1.25 MG (50000 UNIT) SCH UNIT: 1.25 CAP at 17:10

## 2019-09-13 RX ADMIN — SERTRALINE HYDROCHLORIDE SCH MG: 100 TABLET ORAL at 08:24

## 2019-09-13 RX ADMIN — PANTOPRAZOLE SODIUM SCH MG: 40 TABLET, DELAYED RELEASE ORAL at 08:24

## 2019-09-13 RX ADMIN — TRAZODONE HYDROCHLORIDE SCH MG: 50 TABLET, FILM COATED ORAL at 20:09

## 2019-09-13 NOTE — PDOC
Exam


Note:


Ashish Note:


Please also refer to the separate dictated note~for this date of service 

dictated separately. Discussed the patient with Nursing staff reviewed the 

chart.~Reviewed interim history and current functioning. Reviewed vital 

signs,~Labs/ Radiology~and current medications noted below. Continue current 

treatment with the changes noted in the dictated addendum note





Assessment:


Vital Signs/I&O:





                                   Vital Signs








  Date Time  Temp Pulse Resp B/P (MAP) Pulse Ox O2 Delivery O2 Flow Rate FiO2


 


9/13/19 16:57 98.0 121 22 93/64 (74) 96   








Labs:





                                Laboratory Tests








Test


 9/13/19


07:11


 


Magnesium Level


 1.7 mg/dL


(1.8-2.4)  L


 


Iron Level


 39 ug/dL


()  L


 


Total Iron Binding Capacity


 180 ug/dL


(250-450)  L


 


Iron Saturation 22 % (15-34)  


 


Triglycerides Level


 94 mg/dL


(0-150)


 


Cholesterol Level


 132 mg/dL


(0-200)


 


LDL Cholesterol, Calculated


 88 mg/dL


(0-100)


 


VLDL Cholesterol, Calculated


 18 mg/dL


(0-40)


 


Non-HDL Cholesterol Calculated


 106 mg/dL


(0-129)


 


HDL Cholesterol


 26 mg/dL


(40-60)  L


 


Cholesterol/HDL Ratio 5.0  


 


25-Hydroxy Vitamin D Total


 24.6 ng/mL


()  L


 


Thyroxine (T4)


 5.2 ug/dL


(4.5-12.0)


 


Total Triiodothyronine (TT3)


 60 ng/dL


()  L


 


Treponema pallidum Antibody


 Nonreactive


(Nonreactive)











Current Medications:


Meds:





Current Medications








 Medications


  (Trade)  Dose


 Ordered  Sig/Huseyin


 Route


 PRN Reason  Start Time


 Stop Time Status Last Admin


Dose Admin


 


 Mirtazapine


  (Remeron)  15 mg  DAILY


 PO


   9/13/19 09:00


    9/13/19 08:24





 


 Sertraline HCl


  (Zoloft)  100 mg  DAILY


 PO


   9/13/19 09:00


    9/13/19 08:24





 


 Trazodone HCl


  (Desyrel)  50 mg  HS


 PO


   9/13/19 21:00


    9/13/19 20:09





 


 Pantoprazole


 Sodium


  (Protonix)  40 mg  DAILYAC


 PO


   9/13/19 07:30


    9/13/19 08:24





 


 Vitamin D


  (Vitamin D3)  50,000 unit  WEEKLY


 PO


   9/13/19 16:45


    9/13/19 17:10











I have reviewed the current psychotropics carefully including drug interactions.

 Risk benefit ratio favors no change other than as noted in my dictated progress

note.





Diagnosis:


Problems:  


(1) Anxiety disorder


(2) Major depressive disorder, recurrent episode


(3) Mild cognitive disorder











TERA JAY MD                 Sep 13, 2019 21:48

## 2019-09-13 NOTE — PN
DATE:  09/12/2019



PSYCHIATRIC PROGRESS NOTE



This late entry 09/12/2019 covers elements not covered in my initial note.



SUBJECTIVE:  I met with the patient evening of 09/12/2019 in ICU bed 3. 

Previously discussed with Jennifer Denson,  regarding the

patient's progressive symptoms of depression, withdrawal, passive suicidal

ideation and need for transfer to [facility].  Also discussed

with NIGEL Lyons and nursing staff in the ICU.  Initially, the patient was not

willing to come to [facility] or participate in activities or

take her medications, but on further questioning by the nursing staff and after

I met with her evening of 09/12/2019, she was willing to have her depression,

treated inpatient on the [facility] and participate in

treatment.



REVIEW OF SYSTEMS:  Ambulation impaired and lying in bed.  She has been

incontinent at times even though she can be continent per nursing staff.  Later

in the evening of 09/12/2019, she was continent.



MENTAL STATUS EXAMINATION:  Speech is low in rate and rhythm, low in volume,

often responses monosyllabic.  Abstraction fair, computation impaired, language

function intact.  Mood and affect is depressed, anxious.



IMPRESSION:  Major depressive disorder, recurrent, severe with history of

suicidal ideation; anxiety disorder, unspecified.  Rest unchanged.



PLAN:  No change from initial note.  We will go ahead and transfer the patient

to [facility] either the evening of 09/12/2019 or morning of

09/13/2019 when she is medically stable in the ICU.  Discussed with all

concerned.





______________________________

TERA JAY MD



DR:  JAYLEEN/ted  JOB#:  [id number] / [id number]

DD:  09/13/2019 13:42  DT:  09/13/2019 23:16

## 2019-09-13 NOTE — PN
DATE:  09/12/2019



SUBJECTIVE:  The patient is resting, slightly propped up in bed, in no apparent

distress, awake, alert; however, she claims that she is not feeling well;

however, she is not specific about her complaint.  She basically today feels

incontinent.  She claims that she cannot walk or stand, but the nursing staff

today managed to get her out of the bed to the chair.



PHYSICAL EXAMINATION:

GENERAL:  When I examined her, she looked pale, no jaundice, cyanosis or

thyromegaly.  No jugular venous distention.  No limb edema.

VITAL SIGNS:  Her heart rate was 77, blood pressure was 90/60, temperature was

98, respiratory rate was 18 and oxygen saturation was 95%.

HEAD, EYES, EARS, NOSE AND THROAT:  Normocephalic, atraumatic.

NECK:  Supple.

HEART:  Showed normal first and second heart sounds.  No gallop or murmur.

CHEST:  Clear to auscultation.  No crepitation or rhonchi.

ABDOMEN:  Soft, nontender.  No guarding or rigidity.  No organomegaly.  All

hernial orifices intact.  Bowel sounds normal.

NEUROLOGIC:  She was awake, alert, responding appropriately.  All cranial nerves

intact.  She moves extremities without difficulty.



Her intake over the last 24 hours was 2700 and output was 500.



LABORATORY DATA:  As of this morning, her white cell count is down to 4500,

hemoglobin 12.5, hematocrit 37.7, MCV 90 and platelet count 282,000.  Her serum

sodium was 143, potassium 4.4, chloride 113, bicarbonate 21, anion gap of 9, BUN

10, creatinine 0.7, estimated GFR was 84 mL per minute.  Her glucose 112,

calcium was 7.9, magnesium was 1.8.



ASSESSMENT:

1.  A 63-year-old  female patient with dehydration, hypotension, lactic

acidosis.

2.  Acute kidney injury.

3.  Mild hypokalemia.

4.  Hypomagnesemia.



PLAN:  Her serum potassium resolved.  BUN and creatinine is down.  Creatinine is

down to 0.9 and magnesium is 1.8.  Severe hypoalbuminemia.  From medical point

of view, the patient has stabilized, and medically, she is stable.  She was well

hydrated.  Her creatinine came down from 1.5 to 0.7.  Her potassium also has

improved from 3.1 to 4.4.  Lactic acid has resolved; and therefore, the patient

can be transferred upstairs to [facility] for inpatient psychiatric

stabilization.





______________________________

JENNIFER YATES MD



DR:  PHILIPPE/ted  JOB#:  [id number] / [id number]

DD:  09/12/2019 12:59  DT:  09/13/2019 00:29

## 2019-09-14 VITALS — SYSTOLIC BLOOD PRESSURE: 101 MMHG | DIASTOLIC BLOOD PRESSURE: 64 MMHG

## 2019-09-14 VITALS — SYSTOLIC BLOOD PRESSURE: 89 MMHG | DIASTOLIC BLOOD PRESSURE: 54 MMHG

## 2019-09-14 LAB
ALBUMIN SERPL-MCNC: 2.6 G/DL (ref 3.4–5)
ALBUMIN/GLOB SERPL: 0.9 {RATIO} (ref 1–1.7)
ALP SERPL-CCNC: 63 U/L (ref 46–116)
ALT SERPL-CCNC: 8 U/L (ref 14–59)
ANION GAP SERPL CALC-SCNC: 6 MMOL/L (ref 6–14)
AST SERPL-CCNC: 13 U/L (ref 15–37)
BILIRUB SERPL-MCNC: 0.3 MG/DL (ref 0.2–1)
BUN/CREAT SERPL: 8 (ref 6–20)
CA-I SERPL ISE-MCNC: 6 MG/DL (ref 7–20)
CALCIUM SERPL-MCNC: 8.6 MG/DL (ref 8.5–10.1)
CHLORIDE SERPL-SCNC: 109 MMOL/L (ref 98–107)
CO2 SERPL-SCNC: 27 MMOL/L (ref 21–32)
CREAT SERPL-MCNC: 0.8 MG/DL (ref 0.6–1)
GFR SERPLBLD BASED ON 1.73 SQ M-ARVRAT: 72 ML/MIN
GLOBULIN SER-MCNC: 3 G/DL (ref 2.2–3.8)
GLUCOSE SERPL-MCNC: 92 MG/DL (ref 70–99)
HBA1C MFR BLD: 5.4 % (ref 4.8–5.6)
POTASSIUM SERPL-SCNC: 4.1 MMOL/L (ref 3.5–5.1)
PROT SERPL-MCNC: 5.6 G/DL (ref 6.4–8.2)
SODIUM SERPL-SCNC: 142 MMOL/L (ref 136–145)

## 2019-09-14 RX ADMIN — MIRTAZAPINE SCH MG: 15 TABLET, FILM COATED ORAL at 08:29

## 2019-09-14 RX ADMIN — SERTRALINE HYDROCHLORIDE SCH MG: 100 TABLET ORAL at 08:29

## 2019-09-14 RX ADMIN — TRAZODONE HYDROCHLORIDE SCH MG: 50 TABLET, FILM COATED ORAL at 20:07

## 2019-09-14 RX ADMIN — PANTOPRAZOLE SODIUM SCH MG: 40 TABLET, DELAYED RELEASE ORAL at 08:29

## 2019-09-14 RX ADMIN — MIRTAZAPINE SCH MG: 15 TABLET, FILM COATED ORAL at 20:08

## 2019-09-14 NOTE — PDOC
Exam


Note:


Ashish Note:


Please also refer to the separate dictated note~for this date of service 

dictated separately.~Patient seen individually. Discussed the patient with 

Nursing staff reviewed the chart.~Reviewed interim history and current 

functioning. Reviewed vital signs,~Labs/ Radiology~and current medications noted

below. Continue current treatment with the changes noted in the dictated 

addendum note





Assessment:


Vital Signs/I&O:





                                   Vital Signs








  Date Time  Temp Pulse Resp B/P (MAP) Pulse Ox O2 Delivery O2 Flow Rate FiO2


 


9/14/19 15:29 98.5 80 18 89/54 (66) 97   














                                    I & O   


 


 9/13/19 9/13/19 9/14/19





 15:00 23:00 07:00


 


Intake Total 240 ml 240 ml 240 ml


 


Balance 240 ml 240 ml 240 ml








Labs:





                                Laboratory Tests








Test


 9/14/19


07:57


 


Sodium Level


 142 mmol/L


(136-145)


 


Potassium Level


 4.1 mmol/L


(3.5-5.1)


 


Chloride Level


 109 mmol/L


()  H


 


Carbon Dioxide Level


 27 mmol/L


(21-32)


 


Anion Gap 6 (6-14)  


 


Blood Urea Nitrogen


 6 mg/dL (7-20)


L


 


Creatinine


 0.8 mg/dL


(0.6-1.0)


 


Estimated GFR


(Cockcroft-Gault) 72.0  





 


BUN/Creatinine Ratio 8 (6-20)  


 


Glucose Level


 92 mg/dL


(70-99)


 


Calcium Level


 8.6 mg/dL


(8.5-10.1)


 


Total Bilirubin


 0.3 mg/dL


(0.2-1.0)


 


Aspartate Amino Transferase


(AST) 13 U/L (15-37)


L


 


Alanine Aminotransferase (ALT)


 8 U/L (14-59)


L


 


Alkaline Phosphatase


 63 U/L


()


 


Total Protein


 5.6 g/dL


(6.4-8.2)  L


 


Albumin


 2.6 g/dL


(3.4-5.0)  L


 


Albumin/Globulin Ratio


 0.9 (1.0-1.7)


L











Current Medications:


Meds:





Current Medications








 Medications


  (Trade)  Dose


 Ordered  Sig/Huseyin


 Route


 PRN Reason  Start Time


 Stop Time Status Last Admin


Dose Admin


 


 Trazodone HCl


  (Desyrel)  50 mg  HS


 PO


   9/13/19 21:00


    9/13/19 20:09











I have reviewed the current psychotropics carefully including drug interactions.

 Risk benefit ratio favors no change other than as noted in my dictated progress

note.





Diagnosis:


Problems:  


(1) Anxiety disorder


(2) Major depressive disorder, recurrent episode


(3) Mild cognitive disorder











TERA JAY MD                 Sep 14, 2019 19:59

## 2019-09-14 NOTE — CONS
DATE OF CONSULTATION:  09/13/2019



REASON FOR CONSULTATION:  Medical management.



HISTORY OF PRESENT ILLNESS:  The patient is a 63-year-old  female

patient who was seen initially in the Emergency Room where she was found to be

extremely dehydrated, tachycardic, hypertensive with elevated lactic acid, and

therefore, she was transferred to the ICU where she was aggressively rehydrated

and her kidney function, electrolytes, and lactic acid had normalized and was

transferred to [facility] for inpatient psychiatric stabilization. 

The patient has a history of worsening symptoms of depression, feeling hopeless,

helpless, worthless.  She has been isolative, living at home with her ,

not eating or drinking and getting progressively dehydrated.  She apparently has

failed prior hospitalization at [facility] for narcotic addiction and prior

inpatient psychiatric stabilization.  She apparently stated that she had no

reason for depression, but on further inquiry, apparently her son, the only

child hung himself while living with them at home about 3 years ago.  The

patient reluctantly admitted that she has been ruminating about these

significant worsening mood symptoms.



PAST MEDICAL HISTORY:  Significant for left hip avascular necrosis.  She was

also found to have severe [id number], hypotension, lactic acidosis, mild

hypokalemia and hypomagnesemia that have all resolved.



ALLERGIES:  She is ALLERGIC TO PENICILLIN.



FAMILY HISTORY:  Noncontributory.



SOCIAL HISTORY:  She lives at home with her .  She does not drink alcohol

or use any recreational drugs, although she has had history of narcotic in the

past because of the pain in her left hip joint.



MEDICATIONS:  She is currently on following medications:  She is on Tylenol 650

mg every 4 hours, mirtazapine 15 mg daily, sertraline 100 mg daily, trazodone 50

mg daily and Protonix 40 mg once a day.



PHYSICAL EXAMINATION:

GENERAL:  On examining her today, she was definitely different.  She was more

awake, alert, has been apparently up and about.  She was pale, extremely

cachectic.  Her body mass index was only 16.7 kilograms/square meter.  However,

there is no jaundice, cyanosis or thyromegaly.  No jugular venous distention. 

No lower limb edema.

VITAL SIGNS:  Her heart rate was 82, blood pressure was 94/59, temperature was

97, respiratory rate was 18 and oxygen saturation was 96%.

HEAD, EYES, EARS, NOSE AND THROAT:  Showed normocephalic, atraumatic.

NECK:  Supple.

HEART:  Showed normal first and second heart sounds with no gallop, rub or

murmur.

CHEST:  Clear to auscultation.  No crepitation or rhonchi.

ABDOMEN:  Distended, soft, nontender.

NEUROLOGIC:  She was more awake, alert, responding appropriately.  All cranial

nerves intact.

EXTREMITIES:  She moves extremities without difficulty.  She ambulates without

assistance or assistive devices.



LABORATORY DATA:  Showed that her serum triglycerides were 94%, total

cholesterol 132, LDL cholesterol was 88, VLDL was 18, and HDL cholesterol was

26, the ratio was 5.  Her 25-hydroxy vitamin D was only 24.6.  Her serum iron

was 39, TIBC was 180 and iron saturation was 22.  Magnesium was 1.7.



IMPRESSION:  In summary, this is a 63-year-old  female patient who was

admitted with worsening symptoms of depression, feeling hopeless, helpless,

worthless.  She has been withdrawn, not eating or drinking, and getting

progressively dehydrated.  She was evaluated initially and was found to be

extremely dehydrated with marked hypokalemia, hypomagnesemia as well as lactic

acidosis and acute kidney injury; all have been resolved.  She was admitted to

the ICU and she is now for inpatient psychiatric stabilization.



PLAN:  My plan is to basically make sure that all her kidney function remained

stable.  We will monitor her electrolytes and her lab work periodically to

ensure stability.



Thank you, Dr. Mcleod for allowing me to participate in the care of this patient.





______________________________

JENNIFER YATES MD



DR:  PHILIPPE/ted  JOB#:  [id number] / [id number]

DD:  09/13/2019 16:38  DT:  09/14/2019 00:32

## 2019-09-14 NOTE — HP
ADMIT DATE:  09/13/2019



PSYCHIATRIC ADMISSION HISTORY AND EVALUATION



This late entry, date of service 09/13/2019, covers elements not covered in my

initial note 09/13/2019.



IDENTIFYING DATA:  The patient is a 63-year-old  female who was

transferred to us from ICU bed 3 at [facility], referred by Dr. Rudolph on

account of worsening symptoms of depression and suicidal ideation.  The patient

had been living at home with her spouse getting aggressive towards the spouse,

refusing to work with staff, wanting to be left alone, frequently lying in the

urine, believes she is not capable of taking care of herself.  She had marked

poor intake, refusing to get out of bed.  She was taken to the local Emergency

Room close to her home, felt to be a potential danger to herself due to

inability to care for herself and her intake and referred for inpatient

psychiatric hospitalization.  She came to our unit, was medically unstable,

transferred to the ICU and now she has been medically stabilized and referred

back to us for further inpatient psychiatric stabilization.



CHIEF COMPLAINT:  "Yes, I am depressed."



HISTORY OF PRESENT ILLNESS:  The patient has a history of worsening symptoms of

depression, feeling hopeless, helpless, worthless with poor appetite, sleep

disturbance, apathy at motivation, tiredness.  She has appeared more confused as

well.  No clear history of bipolar disorder.  She has been somewhat paranoid. 

No homicidal ideation.



PAST PSYCHIATRIC HISTORY:  The patient has been at the [facility] in the past and failed all of this.



MEDICAL HISTORY:  Positive for dehydration, hypokalemia, hypotension,

hypomagnesemia, GERD, degenerative joint disease, hyperlipidemia, COPD, left hip

necrosis.



CODE STATUS:  Full code.



ALLERGIES:  PENICILLIN.



DIET:  GI soft ground with strawberry Boost, takes medications whole, ambulates

independently.



CURRENT PSYCHOTROPICS:  Remeron 15 mg daily, Zoloft 100 mg a day, trazodone 50

mg at bedtime.



FAMILY HISTORY:  Noncontributory.



SOCIAL HISTORY:  No alcohol, drug abuse, physical, sexual or elder abuse history

is noted.  She is not known to be a perpetrator.  The patient lives at home with

her .  She was not forthcoming, but on persistent questioning, she

admitted that her son committed suicide by hanging himself while he was in the

home with his parents, this was about 3 years ago.  The patient admits she may

be getting more ruminative, obsessive, depressed about this.



REACTION TO HOSPITALIZATION:  The patient accepting of it.



ASSETS:  Cognitively reasonably intact, supportive .



MENTAL STATUS EXAMINATION:  The patient was seen individually evening of

09/13/2019.  She is withdrawn and isolative.  Speech moderate latency, often

responses monosyllabic.  Abstraction fair, computation impaired.  No active

suicidal or homicidal ideation.  Mood is depressed.  Affect is mood congruent. 

She does have some short-term memory deficits.



LABORATORY DATA:  Reviewed.



IMPRESSION:  Major depressive disorder, recurrent with psychotic features;

anxiety disorder, unspecified; mild cognitive impairment.  Rest as above.



PLAN:  Admit to Geropsychiatry Unit at [facility].  I will see the

patient daily individually from a psychiatric standpoint.  Medical followup with

Dr. Rudolph.  We will add Abilify 2 mg a day, Wellbutrin- mg a.m. to augment,

Zoloft 100 mg a day.  She is on Remeron 15 mg daily, which will change to

bedtime.  We will make further adjustments as clinically indicated.  Estimated

length of stay 10-12 days.



DISPOSITION PLANS:  Back home with outpatient treatment at discharge or at

partial hospital program.





______________________________

TERA JAY MD



DR:  JAYLEEN/ted  JOB#:  [id number] / [id number]

DD:  09/14/2019 19:43  DT:  09/14/2019 20:23

## 2019-09-15 VITALS — DIASTOLIC BLOOD PRESSURE: 55 MMHG | SYSTOLIC BLOOD PRESSURE: 83 MMHG

## 2019-09-15 VITALS — DIASTOLIC BLOOD PRESSURE: 57 MMHG | SYSTOLIC BLOOD PRESSURE: 91 MMHG

## 2019-09-15 VITALS — DIASTOLIC BLOOD PRESSURE: 65 MMHG | SYSTOLIC BLOOD PRESSURE: 109 MMHG

## 2019-09-15 RX ADMIN — TRAZODONE HYDROCHLORIDE SCH MG: 50 TABLET, FILM COATED ORAL at 20:00

## 2019-09-15 RX ADMIN — MIRTAZAPINE SCH MG: 15 TABLET, FILM COATED ORAL at 19:59

## 2019-09-15 RX ADMIN — SERTRALINE HYDROCHLORIDE SCH MG: 100 TABLET ORAL at 08:01

## 2019-09-15 RX ADMIN — BUPROPION HYDROCHLORIDE SCH MG: 150 TABLET, FILM COATED, EXTENDED RELEASE ORAL at 08:03

## 2019-09-15 RX ADMIN — PANTOPRAZOLE SODIUM SCH MG: 40 TABLET, DELAYED RELEASE ORAL at 08:01

## 2019-09-15 NOTE — PN
DATE:  09/14/2019



PSYCHIATRIC PROGRESS NOTE.



This late entry of 09/14/2019 covers the elements not covered in my initial

note.



SUBJECTIVE:  I met with the patient in the evening of 09/14/2019.  The patient

slept 6-1/2 hours previous night.  She remains somewhat isolative, withdrawn,

spends much time in her room, but does come out a little bit more than before. 

She had not had dinner by the time I met with her, and dinner was almost over. 

I went to her room, encouraged her to get out of the room and she walked with me

to the dining room and nursing staff gave her tray.  Previously, she refused

this.  She does respond to a concerted encouragement.



REVIEW OF SYSTEMS:  No CV, , pulmonary, eye, ENT system symptoms on review.



MENTAL STATUS EXAM:  Reasonably oriented.  Speech is coherent, abstraction fair,

computation impaired, language function intact, attention span short.  Mood and

affect withdrawn, depressed.  No active suicidal ideation.



LABORATORY DATA:  Reviewed.



IMPRESSION:  Major depressive disorder with possible psychotic features; mild

cognitive impairment; anxiety disorder, unspecified.



PLAN:  Continue Zoloft 100 mg a day, augment with Wellbutrin- mg a day and

Abilify 2 mg a day.  Change the Remeron from 15 mg a.m. to 15 mg at bedtime. 

Continue trazodone 50 mg at bedtime p.r.n.  Make further adjustments as

clinically indicated.





______________________________

TERA JAY MD



DR:  JAYLEEN/ted  JOB#:  [id number] / [id number]

DD:  09/15/2019 14:25  DT:  09/15/2019 21:58

## 2019-09-15 NOTE — PDOC
Exam


Note:


Ashish Note:


Please also refer to the separate dictated note~for this date of service 

dictated separately.~Patient seen individually. Discussed the patient with 

Nursing staff reviewed the chart.~Reviewed interim history and current 

functioning. Reviewed vital signs,~Labs/ Radiology~and current medications noted

below. Continue current treatment with the changes noted in the dictated 

addendum note





Assessment:


Vital Signs/I&O:





                                   Vital Signs








  Date Time  Temp Pulse Resp B/P (MAP) Pulse Ox O2 Delivery O2 Flow Rate FiO2


 


9/15/19 19:30  86  91/57 (68)    


 


9/15/19 16:21 98.4  18  97 Room Air  














                                    I & O   


 


 9/14/19 9/14/19 9/15/19





 15:00 23:00 07:00


 


Intake Total 480 ml 0 ml 


 


Balance 480 ml 0 ml 











Current Medications:


Meds:





Current Medications








 Medications


  (Trade)  Dose


 Ordered  Sig/Huseyin


 Route


 PRN Reason  Start Time


 Stop Time Status Last Admin


Dose Admin


 


 Aripiprazole


  (Abilify)  2 mg  DAILY


 PO


   9/15/19 09:00


    9/15/19 08:03





 


 Bupropion HCl


  (Wellbutrin Xl)  150 mg  DAILY


 PO


   9/15/19 09:00


    9/15/19 08:03











I have reviewed the current psychotropics carefully including drug interactions.

 Risk benefit ratio favors no change other than as noted in my dictated progress

note.





Diagnosis:


Problems:  


(1) Anxiety disorder


(2) Major depressive disorder, recurrent episode


(3) Mild cognitive disorder











TERA JAY MD                 Sep 15, 2019 21:44

## 2019-09-16 VITALS — DIASTOLIC BLOOD PRESSURE: 63 MMHG | SYSTOLIC BLOOD PRESSURE: 96 MMHG

## 2019-09-16 VITALS — DIASTOLIC BLOOD PRESSURE: 63 MMHG | SYSTOLIC BLOOD PRESSURE: 95 MMHG

## 2019-09-16 RX ADMIN — ACETAMINOPHEN PRN MG: 325 TABLET, FILM COATED ORAL at 13:15

## 2019-09-16 RX ADMIN — TRAZODONE HYDROCHLORIDE SCH MG: 50 TABLET, FILM COATED ORAL at 20:39

## 2019-09-16 RX ADMIN — MIRTAZAPINE SCH MG: 15 TABLET, FILM COATED ORAL at 20:39

## 2019-09-16 RX ADMIN — SERTRALINE HYDROCHLORIDE SCH MG: 100 TABLET ORAL at 08:31

## 2019-09-16 RX ADMIN — BUPROPION HYDROCHLORIDE SCH MG: 150 TABLET, FILM COATED, EXTENDED RELEASE ORAL at 08:31

## 2019-09-16 RX ADMIN — PANTOPRAZOLE SODIUM SCH MG: 40 TABLET, DELAYED RELEASE ORAL at 08:31

## 2019-09-16 NOTE — PN
DATE:  09/15/2019



PSYCHIATRIC PROGRESS NOTE.



This late entry of 09/15/2019 covers the elements not covered in my initial

note.



SUBJECTIVE:  I met with the patient in the evening of 09/15/2019 at length.  The

patient has been isolative, withdrawn, slept 9 hours previous night, spends much

time in her room, comes out for meals, but has not eaten much during the day,

takes her meds with persistence by nursing staff.  Remains with poor eye

contact.  Blood pressure has been low, we will defer to Dr. Rudolph.



REVIEW OF SYSTEMS:  Positive for tiredness.  No CV, , pulmonary, eye, ENT

system symptoms on review.



MENTAL STATUS EXAMINATION:  The patient is reasonably oriented.  Speech moderate

latency, often responses monosyllabic.  Abstraction fair, computation impaired,

language function intact, attention span short.  Mood and affect is depressed,

withdrawn.  She denies active suicidal ideation, but is quite isolative.



LABORATORY DATA:  Reviewed.



IMPRESSION:  Major depressive disorder with psychotic features; anxiety

disorder, unspecified; mild cognitive impairment.



PLAN:  Maintain Remeron 15 mg at bedtime, Zoloft 100 mg a day, trazodone at

bedtime 50 mg, Wellbutrin- mg a day, Abilify 2 mg daily to augment the

Zoloft and Wellbutrin.  Adjust further as clinically indicated.





______________________________

MAN BRIAN JAY MD



DR:  JAYLEEN/ted  JOB#:  [id number] / [id number]

DD:  09/16/2019 12:41  DT:  09/16/2019 22:08

## 2019-09-16 NOTE — PDOC
Exam


Note:


Ashish Note:


Please also refer to the separate dictated note~for this date of service 

dictated separately.~Patient seen individually. Discussed the patient with 

Nursing staff reviewed the chart.~Reviewed interim history and current 

functioning. Reviewed vital signs,~Labs/ Radiology~and current medications noted

below. Continue current treatment with the changes noted in the dictated 

addendum note





Assessment:


Vital Signs/I&O:





                                   Vital Signs








  Date Time  Temp Pulse Resp B/P (MAP) Pulse Ox O2 Delivery O2 Flow Rate FiO2


 


9/16/19 16:25 98.0 92 18 96/63 (74) 98   


 


9/15/19 16:21      Room Air  














                                    I & O   


 


 9/15/19 9/15/19 9/16/19





 15:00 23:00 07:00


 


Intake Total 480 ml 240 ml 


 


Balance 480 ml 240 ml 











Current Medications:


I have reviewed the current psychotropics carefully including drug interactions.

 Risk benefit ratio favors no change other than as noted in my dictated progress

note.





Diagnosis:


Problems:  


(1) Anxiety disorder


(2) Major depressive disorder, recurrent episode


(3) Mild cognitive disorder











TERA JAY MD                 Sep 16, 2019 21:41

## 2019-09-17 VITALS — DIASTOLIC BLOOD PRESSURE: 61 MMHG | SYSTOLIC BLOOD PRESSURE: 100 MMHG

## 2019-09-17 VITALS — SYSTOLIC BLOOD PRESSURE: 94 MMHG | DIASTOLIC BLOOD PRESSURE: 60 MMHG

## 2019-09-17 RX ADMIN — TRAZODONE HYDROCHLORIDE SCH MG: 50 TABLET, FILM COATED ORAL at 21:01

## 2019-09-17 RX ADMIN — PANTOPRAZOLE SODIUM SCH MG: 40 TABLET, DELAYED RELEASE ORAL at 07:36

## 2019-09-17 RX ADMIN — BUPROPION HYDROCHLORIDE SCH MG: 150 TABLET, FILM COATED, EXTENDED RELEASE ORAL at 07:36

## 2019-09-17 RX ADMIN — MIRTAZAPINE SCH MG: 15 TABLET, FILM COATED ORAL at 21:01

## 2019-09-17 RX ADMIN — SERTRALINE HYDROCHLORIDE SCH MG: 100 TABLET ORAL at 07:36

## 2019-09-17 NOTE — PDOC
Exam


Note:


Ashish Note:


Please also refer to the separate dictated note~for this date of service 

dictated separately.~Patient seen individually. Discussed the patient with 

Nursing staff reviewed the chart.~Reviewed interim history and current 

functioning. Reviewed vital signs,~Labs/ Radiology~and current medications noted

below. Continue current treatment with the changes noted in the dictated 

addendum note





Assessment:


Vital Signs/I&O:





                                   Vital Signs








  Date Time  Temp Pulse Resp B/P (MAP) Pulse Ox O2 Delivery O2 Flow Rate FiO2


 


9/17/19 15:53 98.1 85 16 100/61 (74) 97 Room Air  














                                    I & O   


 


 9/16/19 9/16/19 9/17/19





 15:00 23:00 07:00


 


Intake Total 600 ml 360 ml 240 ml


 


Balance 600 ml 360 ml 240 ml











Current Medications:


I have reviewed the current psychotropics carefully including drug interactions.

 Risk benefit ratio favors no change other than as noted in my dictated progress

note.





Diagnosis:


Problems:  


(1) Anxiety disorder


(2) Major depressive disorder, recurrent episode


(3) Mild cognitive disorder











TERA JAY MD                 Sep 17, 2019 22:00

## 2019-09-18 VITALS — SYSTOLIC BLOOD PRESSURE: 95 MMHG | DIASTOLIC BLOOD PRESSURE: 62 MMHG

## 2019-09-18 VITALS — DIASTOLIC BLOOD PRESSURE: 60 MMHG | SYSTOLIC BLOOD PRESSURE: 93 MMHG

## 2019-09-18 RX ADMIN — PANTOPRAZOLE SODIUM SCH MG: 40 TABLET, DELAYED RELEASE ORAL at 08:21

## 2019-09-18 RX ADMIN — TRAZODONE HYDROCHLORIDE SCH MG: 50 TABLET, FILM COATED ORAL at 21:07

## 2019-09-18 RX ADMIN — SERTRALINE HYDROCHLORIDE SCH MG: 100 TABLET ORAL at 08:21

## 2019-09-18 RX ADMIN — MIRTAZAPINE SCH MG: 15 TABLET, FILM COATED ORAL at 21:07

## 2019-09-18 RX ADMIN — BUPROPION HYDROCHLORIDE SCH MG: 300 TABLET, EXTENDED RELEASE ORAL at 08:21

## 2019-09-18 NOTE — PDOC
Exam


Note:


Ashish Note:


Please also refer to the separate dictated note~for this date of service 

dictated separately.~Patient seen individually. Discussed the patient with 

Nursing staff reviewed the chart.~Reviewed interim history and current 

functioning. Reviewed vital signs,~Labs/ Radiology~and current medications noted

below. Continue current treatment with the changes noted in the dictated 

addendum note





Assessment:


Vital Signs/I&O:





                                   Vital Signs








  Date Time  Temp Pulse Resp B/P (MAP) Pulse Ox O2 Delivery O2 Flow Rate FiO2


 


9/18/19 15:53 99.1 87 18 93/60 (71) 95   


 


9/17/19 15:53      Room Air  














                                    I & O   


 


 9/17/19 9/17/19 9/18/19





 15:00 23:00 07:00


 


Intake Total 420 ml 240 ml 240 ml


 


Balance 420 ml 240 ml 240 ml











Current Medications:


Meds:





Current Medications








 Medications


  (Trade)  Dose


 Ordered  Sig/Huseyin


 Route


 PRN Reason  Start Time


 Stop Time Status Last Admin


Dose Admin


 


 Bupropion HCl


  (Wellbutrin Xl)  300 mg  DAILY


 PO


   9/18/19 09:00


    9/18/19 08:21





 


 Aripiprazole


  (Abilify)  5 mg  HS


 PO


   9/18/19 21:00


    9/18/19 21:08











I have reviewed the current psychotropics carefully including drug interactions.

 Risk benefit ratio favors no change other than as noted in my dictated progress

note.





Diagnosis:


Problems:  


(1) Anxiety disorder


(2) Major depressive disorder, recurrent episode


(3) Mild cognitive disorder











TERA JAY MD                 Sep 18, 2019 22:03

## 2019-09-18 NOTE — PN
DATE:  09/16/2019



PSYCHIATRIC PROGRESS NOTE



This late entry 09/16/2019, covers elements not covered in my initial note.



SUBJECTIVE:  I met with the patient evening of 09/16/2019 in her room at length.

 The patient slept 6-3/4 hours previous night.  She has been more in the

dayroom, worked on [employer], attended some groups, all of which is an

improvement perZuleika RN.  She has been drinking some Boost, which is an

improvement.  Complains of teeth hurting.  She has no dentures and that reason

making changes for this.  Compliant with medications.  We will go ahead and

weigh the patient twice a week, given her significant history of poor oral

intake and weight loss with depression prompting this admission.



REVIEW OF SYSTEMS:  No CV, , pulmonary, eye, ENT system symptoms on review. 

Reliability fair.



MENTAL STATUS EXAMINATION:  Oriented to herself and situation.  Speech is

coherent, has some latency.  Abstraction fair, computation impaired, language

function intact.  Mood and affect withdrawn.



LABORATORY DATA:  Reviewed.



IMPRESSION:  Major depressive disorder, recurrent with psychotic features,

anxiety disorder, unspecified.  Rest unchanged.



PLAN:  Continue current psychotropics.  Zoloft 100 mg a day, Wellbutrin-

mg a day, Abilify 2 mg a day, Remeron 15 mg at bedtime, trazodone 50 mg at

bedtime.  Rest unchanged.





______________________________

MAN BRIAN JAY MD



DR:  JAYLEEN/ted  JOB#:  [id number] / [id number]

DD:  09/17/2019 16:06  DT:  09/18/2019 00:18

## 2019-09-19 VITALS — DIASTOLIC BLOOD PRESSURE: 57 MMHG | SYSTOLIC BLOOD PRESSURE: 103 MMHG

## 2019-09-19 VITALS — DIASTOLIC BLOOD PRESSURE: 57 MMHG | SYSTOLIC BLOOD PRESSURE: 91 MMHG

## 2019-09-19 RX ADMIN — TRAZODONE HYDROCHLORIDE SCH MG: 50 TABLET, FILM COATED ORAL at 20:09

## 2019-09-19 RX ADMIN — MIRTAZAPINE SCH MG: 15 TABLET, FILM COATED ORAL at 20:09

## 2019-09-19 RX ADMIN — PANTOPRAZOLE SODIUM SCH MG: 40 TABLET, DELAYED RELEASE ORAL at 08:05

## 2019-09-19 RX ADMIN — BUPROPION HYDROCHLORIDE SCH MG: 300 TABLET, EXTENDED RELEASE ORAL at 08:05

## 2019-09-19 NOTE — PN
DATE:  09/17/2019



PSYCHIATRIC PROGRESS NOTE



This late entry 09/17/2019 covers elements not covered in my initial note.



SUBJECTIVE:  I met with the patient evening of 09/17/2019.  Discussed with

NIGEL Webster, reviewed the chart.  The patient slept 8 hours previous night.  She

remains alert, oriented x 4.  She was somewhat anxious in the morning, but no

hallucinations noted.  She could not eat her breakfast because she has no

dentures and she was provided pureed food for lunch, which she ate better and

then again she got some cubed meat pieces for dinner could not eat these.  We

will let the dietary make appropriate changes for her not having dentures.  She

remains a little withdrawn, isolative, depressed, but slightly more interactive

in groups at times.



REVIEW OF SYSTEMS:  Positive for tiredness.  No CV, , pulmonary, eye system

symptoms on review.



MENTAL STATUS EXAM:  Reasonably oriented.  Speech is coherent, has some latency,

often responses monosyllabic.  Abstraction fair, computation impaired, language

function intact, attention span short.  Mood and affect still somewhat

withdrawn.



LABORATORY DATA:  Reviewed.



IMPRESSION:  Major depressive disorder with psychotic features; anxiety

disorder, unspecified.  Rest unchanged.



PLAN:  Increase the Wellbutrin-XL from 150 mg in the morning to 300 mg a day. 

Check weights twice a week.  Maintain Remeron 15 mg at bedtime, Zoloft 100 mg a

day, trazodone 50 mg at bedtime, Abilify 2 mg a day to augment the

antidepressants.  Adjust further as clinically indicated.





______________________________

TERA JAY MD



DR:  JAYLEEN/ted  JOB#:  [id number] / [id number]

DD:  09/18/2019 10:42  DT:  09/18/2019 23:10

## 2019-09-19 NOTE — PDOC
Exam


Note:


Ashish Note:


Please also refer to the separate dictated note~for this date of service 

dictated separately.~Patient seen individually. Discussed the patient with 

Nursing staff reviewed the chart.~Reviewed interim history and current 

functioning. Reviewed vital signs,~Labs/ Radiology~and current medications noted

below. Continue current treatment with the changes noted in the dictated 

addendum note





Assessment:


Vital Signs/I&O:





                                   Vital Signs








  Date Time  Temp Pulse Resp B/P (MAP) Pulse Ox O2 Delivery O2 Flow Rate FiO2


 


9/19/19 16:47 98.3 94 16 91/57 (68) 98   


 


9/17/19 15:53      Room Air  














                                    I & O   


 


 9/18/19 9/18/19 9/19/19





 14:59 22:59 06:59


 


Intake Total 720 ml 240 ml 0 ml


 


Balance 720 ml 240 ml 0 ml











Current Medications:


Meds:





Current Medications








 Medications


  (Trade)  Dose


 Ordered  Sig/Huseyin


 Route


 PRN Reason  Start Time


 Stop Time Status Last Admin


Dose Admin


 


 Fluvoxamine


 Maleate


  (Luvox)  25 mg  DAILY


 PO


   9/19/19 09:00


 9/21/19 11:00  9/19/19 08:13











I have reviewed the current psychotropics carefully including drug interactions.

 Risk benefit ratio favors no change other than as noted in my dictated progress

note.





Diagnosis:


Problems:  


(1) Anxiety disorder


(2) Major depressive disorder, recurrent episode


(3) Mild cognitive disorder











TERA JAY MD                 Sep 19, 2019 21:43

## 2019-09-20 VITALS — SYSTOLIC BLOOD PRESSURE: 165 MMHG | DIASTOLIC BLOOD PRESSURE: 90 MMHG

## 2019-09-20 VITALS — DIASTOLIC BLOOD PRESSURE: 70 MMHG | SYSTOLIC BLOOD PRESSURE: 105 MMHG

## 2019-09-20 LAB
ALBUMIN SERPL-MCNC: 2.7 G/DL (ref 3.4–5)
ALBUMIN/GLOB SERPL: 0.8 {RATIO} (ref 1–1.7)
ALP SERPL-CCNC: 57 U/L (ref 46–116)
ALT SERPL-CCNC: 10 U/L (ref 14–59)
ANION GAP SERPL CALC-SCNC: 8 MMOL/L (ref 6–14)
AST SERPL-CCNC: 12 U/L (ref 15–37)
BASOPHILS # BLD AUTO: 0 X10^3/UL (ref 0–0.2)
BASOPHILS NFR BLD: 1 % (ref 0–3)
BILIRUB SERPL-MCNC: 0.2 MG/DL (ref 0.2–1)
BUN/CREAT SERPL: 14 (ref 6–20)
CA-I SERPL ISE-MCNC: 13 MG/DL (ref 7–20)
CALCIUM SERPL-MCNC: 8.7 MG/DL (ref 8.5–10.1)
CHLORIDE SERPL-SCNC: 107 MMOL/L (ref 98–107)
CO2 SERPL-SCNC: 29 MMOL/L (ref 21–32)
CREAT SERPL-MCNC: 0.9 MG/DL (ref 0.6–1)
EOSINOPHIL NFR BLD: 0.1 X10^3/UL (ref 0–0.7)
EOSINOPHIL NFR BLD: 3 % (ref 0–3)
ERYTHROCYTE [DISTWIDTH] IN BLOOD BY AUTOMATED COUNT: 12.6 % (ref 11.5–14.5)
GFR SERPLBLD BASED ON 1.73 SQ M-ARVRAT: 62.8 ML/MIN
GLOBULIN SER-MCNC: 3.4 G/DL (ref 2.2–3.8)
GLUCOSE SERPL-MCNC: 91 MG/DL (ref 70–99)
HCT VFR BLD CALC: 34.9 % (ref 36–47)
HGB BLD-MCNC: 11.5 G/DL (ref 12–15.5)
LYMPHOCYTES # BLD: 1.4 X10^3/UL (ref 1–4.8)
LYMPHOCYTES NFR BLD AUTO: 37 % (ref 24–48)
MCH RBC QN AUTO: 30 PG (ref 25–35)
MCHC RBC AUTO-ENTMCNC: 33 G/DL (ref 31–37)
MCV RBC AUTO: 90 FL (ref 79–100)
MONO #: 0.2 X10^3/UL (ref 0–1.1)
MONOCYTES NFR BLD: 6 % (ref 0–9)
NEUT #: 2 X10^3UL (ref 1.8–7.7)
NEUTROPHILS NFR BLD AUTO: 54 % (ref 31–73)
PLATELET # BLD AUTO: 274 X10^3/UL (ref 140–400)
POTASSIUM SERPL-SCNC: 4.1 MMOL/L (ref 3.5–5.1)
PROT SERPL-MCNC: 6.1 G/DL (ref 6.4–8.2)
RBC # BLD AUTO: 3.87 X10^6/UL (ref 3.5–5.4)
SODIUM SERPL-SCNC: 144 MMOL/L (ref 136–145)
WBC # BLD AUTO: 3.8 X10^3/UL (ref 4–11)

## 2019-09-20 RX ADMIN — BUPROPION HYDROCHLORIDE SCH MG: 300 TABLET, EXTENDED RELEASE ORAL at 08:07

## 2019-09-20 RX ADMIN — TRAZODONE HYDROCHLORIDE SCH MG: 50 TABLET, FILM COATED ORAL at 19:23

## 2019-09-20 RX ADMIN — CHOLECALCIFEROL CAP 1.25 MG (50000 UNIT) SCH UNIT: 1.25 CAP at 08:08

## 2019-09-20 RX ADMIN — MIRTAZAPINE SCH MG: 15 TABLET, FILM COATED ORAL at 19:23

## 2019-09-20 RX ADMIN — PANTOPRAZOLE SODIUM SCH MG: 40 TABLET, DELAYED RELEASE ORAL at 08:06

## 2019-09-20 NOTE — PDOC
Exam


Note:


Ashish Note:


Please also refer to the separate dictated note~for this date of service 

dictated separately.~Patient seen individually. Discussed the patient with 

Nursing staff reviewed the chart.~Reviewed interim history and current 

functioning. Reviewed vital signs,~Labs/ Radiology~and current medications noted

below. Continue current treatment with the changes noted in the dictated 

addendum note





Assessment:


Vital Signs/I&O:





                                   Vital Signs








  Date Time  Temp Pulse Resp B/P (MAP) Pulse Ox O2 Delivery O2 Flow Rate FiO2


 


9/20/19 17:08 97.6 85 16 165/90 (115) 98   


 


9/17/19 15:53      Room Air  














                                    I & O   


 


 9/19/19 9/19/19 9/20/19





 15:00 23:00 07:00


 


Intake Total 480 ml 120 ml 0 ml


 


Balance 480 ml 120 ml 0 ml








Labs:





                                Laboratory Tests








Test


 9/20/19


07:07


 


White Blood Count


 3.8 x10^3/uL


(4.0-11.0)  L


 


Red Blood Count


 3.87 x10^6/uL


(3.50-5.40)


 


Hemoglobin


 11.5 g/dL


(12.0-15.5)  L


 


Hematocrit


 34.9 %


(36.0-47.0)  L


 


Mean Corpuscular Volume


 90 fL ()





 


Mean Corpuscular Hemoglobin 30 pg (25-35)  


 


Mean Corpuscular Hemoglobin


Concent 33 g/dL


(31-37)


 


Red Cell Distribution Width


 12.6 %


(11.5-14.5)


 


Platelet Count


 274 x10^3/uL


(140-400)


 


Neutrophils (%) (Auto) 54 % (31-73)  


 


Lymphocytes (%) (Auto) 37 % (24-48)  


 


Monocytes (%) (Auto) 6 % (0-9)  


 


Eosinophils (%) (Auto) 3 % (0-3)  


 


Basophils (%) (Auto) 1 % (0-3)  


 


Neutrophils # (Auto)


 2.0 x10^3uL


(1.8-7.7)


 


Lymphocytes # (Auto)


 1.4 x10^3/uL


(1.0-4.8)


 


Monocytes # (Auto)


 0.2 x10^3/uL


(0.0-1.1)


 


Eosinophils # (Auto)


 0.1 x10^3/uL


(0.0-0.7)


 


Basophils # (Auto)


 0.0 x10^3/uL


(0.0-0.2)


 


Sodium Level


 144 mmol/L


(136-145)


 


Potassium Level


 4.1 mmol/L


(3.5-5.1)


 


Chloride Level


 107 mmol/L


()


 


Carbon Dioxide Level


 29 mmol/L


(21-32)


 


Anion Gap 8 (6-14)  


 


Blood Urea Nitrogen


 13 mg/dL


(7-20)


 


Creatinine


 0.9 mg/dL


(0.6-1.0)


 


Estimated GFR


(Cockcroft-Gault) 62.8  





 


BUN/Creatinine Ratio 14 (6-20)  


 


Glucose Level


 91 mg/dL


(70-99)


 


Calcium Level


 8.7 mg/dL


(8.5-10.1)


 


Total Bilirubin


 0.2 mg/dL


(0.2-1.0)


 


Aspartate Amino Transferase


(AST) 12 U/L (15-37)


L


 


Alanine Aminotransferase (ALT)


 10 U/L (14-59)


L


 


Alkaline Phosphatase


 57 U/L


()


 


Total Protein


 6.1 g/dL


(6.4-8.2)  L


 


Albumin


 2.7 g/dL


(3.4-5.0)  L


 


Albumin/Globulin Ratio


 0.8 (1.0-1.7)


L











Current Medications:


I have reviewed the current psychotropics carefully including drug interactions.

 Risk benefit ratio favors no change other than as noted in my dictated progress

note.





Diagnosis:


Problems:  


(1) Anxiety disorder


(2) Major depressive disorder, recurrent episode


(3) Mild cognitive disorder











TERA JAY MD                 Sep 20, 2019 22:04

## 2019-09-21 VITALS — DIASTOLIC BLOOD PRESSURE: 66 MMHG | SYSTOLIC BLOOD PRESSURE: 104 MMHG

## 2019-09-21 VITALS — SYSTOLIC BLOOD PRESSURE: 111 MMHG | DIASTOLIC BLOOD PRESSURE: 73 MMHG

## 2019-09-21 RX ADMIN — TRAZODONE HYDROCHLORIDE SCH MG: 50 TABLET, FILM COATED ORAL at 20:03

## 2019-09-21 RX ADMIN — PANTOPRAZOLE SODIUM SCH MG: 40 TABLET, DELAYED RELEASE ORAL at 08:36

## 2019-09-21 RX ADMIN — BUPROPION HYDROCHLORIDE SCH MG: 300 TABLET, EXTENDED RELEASE ORAL at 08:36

## 2019-09-21 RX ADMIN — MIRTAZAPINE SCH MG: 15 TABLET, FILM COATED ORAL at 20:03

## 2019-09-21 NOTE — PN
DATE:  09/18/2019



PSYCHIATRIC PROGRESS NOTE



This late entry 09/18/2019 covers elements not covered in my initial note.



SUBJECTIVE:  I met with the patient evening of 09/18/2019 and staffed at a

treatment team meeting with the entire team earlier in the day.  The patient's

, Douglas attended the lengthy treatment team meeting, discussing the

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

years.  The suicide of their son by hanging about 3 years back, which is when

the patient's depression, obsessive symptoms, paranoia seemed to worsen has

significantly escalated.  Appetite 25-50%, slept 7 hours, compliant with meds

and cares and assessments came to some groups.



REVIEW OF SYSTEMS:  Positive for some tiredness.  No CV, , pulmonary, eye

system symptoms on review.



MENTAL STATUS EXAM:  Reasonably oriented.  Speech has some latency, coherent. 

Abstraction fair, computation impaired, language function intact.  Mood and

affect withdrawn.



LABORATORY DATA:  Reviewed.



IMPRESSION:  Unchanged from initial note.



PLAN:  No change from initial note, but we will increase the Abilify to 5 mg a

day, Zoloft will be increased further, but on further review given the mocked

obsessiveness, we will change this to Luvox 25 mg a day for 3 days, increasing

to 50 mg a day and 3 days later to 75 mg a day.  Maintain Wellbutrin at current

dosage along with Remeron.





______________________________

MAN BRIAN JAY MD



DR:  JAYLEEN/ted  JOB#:  [id number] / [id number]

DD:  09/20/2019 21:42  DT:  09/21/2019 10:13

## 2019-09-21 NOTE — PN
DATE:  09/19/2019



PSYCHIATRIC PROGRESS NOTE



This late entry 09/19/2019 covers elements not covered in my initial note.



SUBJECTIVE:  I met with the patient evening of 09/19/2019.  The patient slept

9-1/2 hours previous night.  She has been well oriented, spending more time in

the day room, but appetite is still poor, not aggressive, no suicidal ideation. 

Compliant with medications, better eye contact.  I have informed the nursing

staff that she should be weighed twice a week since poor intake and weight loss

was one of her presenting problems.  I met with her in her room at length.



REVIEW OF SYSTEMS:  No CV, , pulmonary, eye system symptoms on review.  Does

admit to some tiredness.



MENTAL STATUS EXAM:  Reasonably oriented.  Speech has some latency, coherent,

often responses monosyllabic.  Abstraction fair, computation impaired, language

function intact.  Mood and affect still withdrawn, but improved.



LABORATORY DATA:  Reviewed.



IMPRESSION:  Unchanged from initial note.



PLAN:  No change from initial note.





______________________________

TERA JAY MD



DR:  JAYLEEN/ted  JOB#:  [id number] / [id number]

DD:  09/21/2019 08:58  DT:  09/21/2019 19:50

## 2019-09-21 NOTE — PDOC
Exam


Note:


Ashish Note:


Please also refer to the separate dictated note~for this date of service 

dictated separately.~Patient seen individually. Discussed the patient with 

Nursing staff reviewed the chart.~Reviewed interim history and current 

functioning. Reviewed vital signs,~Labs/ Radiology~and current medications noted

below. Continue current treatment with the changes noted in the dictated 

addendum note





Assessment:


Vital Signs/I&O:





                                   Vital Signs








  Date Time  Temp Pulse Resp B/P (MAP) Pulse Ox O2 Delivery O2 Flow Rate FiO2


 


9/21/19 15:58 98.3 81 16 111/73 (86) 98   


 


9/17/19 15:53      Room Air  














                                    I & O   


 


 9/20/19 9/20/19 9/21/19





 15:00 23:00 07:00


 


Intake Total 600 ml 340 ml 


 


Balance 600 ml 340 ml 











Current Medications:


I have reviewed the current psychotropics carefully including drug interactions.

 Risk benefit ratio favors no change other than as noted in my dictated progress

note.





Diagnosis:


Problems:  


(1) Anxiety disorder


(2) Major depressive disorder, recurrent episode


(3) Mild cognitive disorder


(4) Hypokalemia


(5) Acute kidney injury











TERA JAY MD                 Sep 21, 2019 23:04

## 2019-09-22 VITALS — SYSTOLIC BLOOD PRESSURE: 108 MMHG | DIASTOLIC BLOOD PRESSURE: 72 MMHG

## 2019-09-22 VITALS — SYSTOLIC BLOOD PRESSURE: 99 MMHG | DIASTOLIC BLOOD PRESSURE: 66 MMHG

## 2019-09-22 RX ADMIN — BUPROPION HYDROCHLORIDE SCH MG: 300 TABLET, EXTENDED RELEASE ORAL at 09:18

## 2019-09-22 RX ADMIN — MIRTAZAPINE SCH MG: 15 TABLET, FILM COATED ORAL at 20:02

## 2019-09-22 RX ADMIN — TRAZODONE HYDROCHLORIDE SCH MG: 50 TABLET, FILM COATED ORAL at 20:02

## 2019-09-22 RX ADMIN — PANTOPRAZOLE SODIUM SCH MG: 40 TABLET, DELAYED RELEASE ORAL at 09:18

## 2019-09-22 NOTE — PDOC
Exam


Note:


Ashish Note:


Please also refer to the separate dictated note~for this date of service 

dictated separately.~Patient seen individually. Discussed the patient with 

Nursing staff reviewed the chart.~Reviewed interim history and current 

functioning. Reviewed vital signs,~Labs/ Radiology~and current medications noted

below. Continue current treatment with the changes noted in the dictated 

addendum note





Assessment:


Vital Signs/I&O:





                                   Vital Signs








  Date Time  Temp Pulse Resp B/P (MAP) Pulse Ox O2 Delivery O2 Flow Rate FiO2


 


9/22/19 15:57 97.7 89 18 108/72 (84) 98   


 


9/17/19 15:53      Room Air  














                                    I & O   


 


 9/21/19 9/21/19 9/22/19





 15:00 23:00 07:00


 


Intake Total 960 ml 360 ml 


 


Balance 960 ml 360 ml 











Current Medications:


Meds:





Current Medications








 Medications


  (Trade)  Dose


 Ordered  Sig/Huseyin


 Route


 PRN Reason  Start Time


 Stop Time Status Last Admin


Dose Admin


 


 Fluvoxamine


 Maleate


  (Luvox)  50 mg  DAILY


 PO


   9/22/19 09:00


    9/22/19 10:22











I have reviewed the current psychotropics carefully including drug interactions.

 Risk benefit ratio favors no change other than as noted in my dictated progress

note.





Diagnosis:


Problems:  


(1) Anxiety disorder


(2) Major depressive disorder, recurrent episode


(3) Mild cognitive disorder











TERA JAY MD                 Sep 22, 2019 21:39

## 2019-09-22 NOTE — PN
DATE:  09/20/2019



PSYCHIATRIC PROGRESS NOTE



This late entry 09/20/2019 covers the elements not covered in my initial note.



SUBJECTIVE:  I met with the patient in the evening of 09/20/2019.  The patient

slept 10 hours previous night.  Per NIGEL Arechiga, the patient has been quiet,

somewhat withdrawn.  She is lying in bed as I met with her in her room at

length.  She remains well oriented.  She has been in the day room at times.  No

active suicidal ideation, compliant with her medications.



REVIEW OF SYSTEMS:  Positive for some tiredness.  No CV, , pulmonary, eye, ENT

system symptoms on review.



MENTAL STATUS EXAM:  Reasonably oriented.  Speech is coherent, has some latency.

 Abstraction fair, computation impaired, language function intact, attention

span short.  Mood and affect somewhat withdrawn.



LABORATORY DATA:  Reviewed.



IMPRESSION:  Unchanged from initial note.



PLAN:  No change from initial note.





______________________________

MAN BRIAN JAY MD



DR:  JAYLEEN/ted  JOB#:  [id number] / [id number]

DD:  09/21/2019 17:31  DT:  09/22/2019 01:51

## 2019-09-23 VITALS — DIASTOLIC BLOOD PRESSURE: 67 MMHG | SYSTOLIC BLOOD PRESSURE: 104 MMHG

## 2019-09-23 VITALS — SYSTOLIC BLOOD PRESSURE: 117 MMHG | DIASTOLIC BLOOD PRESSURE: 76 MMHG

## 2019-09-23 RX ADMIN — PANTOPRAZOLE SODIUM SCH MG: 40 TABLET, DELAYED RELEASE ORAL at 08:18

## 2019-09-23 RX ADMIN — TRAZODONE HYDROCHLORIDE SCH MG: 50 TABLET, FILM COATED ORAL at 20:44

## 2019-09-23 RX ADMIN — BUPROPION HYDROCHLORIDE SCH MG: 300 TABLET, EXTENDED RELEASE ORAL at 08:17

## 2019-09-23 RX ADMIN — MIRTAZAPINE SCH MG: 15 TABLET, FILM COATED ORAL at 20:44

## 2019-09-23 NOTE — PDOC
Exam


Note:


Ashish Note:


Please also refer to the separate dictated note~for this date of service 

dictated separately.~Patient seen individually. Discussed the patient with 

Nursing staff reviewed the chart.~Reviewed interim history and current 

functioning. Reviewed vital signs,~Labs/ Radiology~and current medications noted

below. Continue current treatment with the changes noted in the dictated 

addendum note





Assessment:


Vital Signs/I&O:





                                   Vital Signs








  Date Time  Temp Pulse Resp B/P (MAP) Pulse Ox O2 Delivery O2 Flow Rate FiO2


 


9/23/19 16:09 98.2 91 16 104/67 (79) 97   


 


9/17/19 15:53      Room Air  














                                    I & O   


 


 9/22/19 9/22/19 9/23/19





 15:00 23:00 07:00


 


Intake Total 600 ml 360 ml 240 ml


 


Balance 600 ml 360 ml 240 ml











Current Medications:


I have reviewed the current psychotropics carefully including drug interactions.

 Risk benefit ratio favors no change other than as noted in my dictated progress

note.





Diagnosis:


Problems:  


(1) Anxiety disorder


(2) Major depressive disorder, recurrent episode


(3) Mild cognitive disorder











TERA JAY MD                 Sep 23, 2019 21:42

## 2019-09-24 VITALS — DIASTOLIC BLOOD PRESSURE: 73 MMHG | SYSTOLIC BLOOD PRESSURE: 110 MMHG

## 2019-09-24 VITALS — DIASTOLIC BLOOD PRESSURE: 78 MMHG | SYSTOLIC BLOOD PRESSURE: 119 MMHG

## 2019-09-24 RX ADMIN — TRAZODONE HYDROCHLORIDE SCH MG: 50 TABLET, FILM COATED ORAL at 21:00

## 2019-09-24 RX ADMIN — BUPROPION HYDROCHLORIDE SCH MG: 300 TABLET, EXTENDED RELEASE ORAL at 08:40

## 2019-09-24 RX ADMIN — PANTOPRAZOLE SODIUM SCH MG: 40 TABLET, DELAYED RELEASE ORAL at 08:40

## 2019-09-24 RX ADMIN — MIRTAZAPINE SCH MG: 15 TABLET, FILM COATED ORAL at 21:00

## 2019-09-24 NOTE — PN
DATE:  09/22/2019



PSYCHIATRIC PROGRESS NOTE



This late entry 09/22/2019 covers the elements not covered in my initial note.



SUBJECTIVE:  I met with the patient in the evening.  The patient slept 9 hours

previous night.  She is lying in bed as I met with her at length.  She is more

social during the day coming out more.  Subjectively states she feels better

with depression.



REVIEW OF SYSTEMS:  No CV, , pulmonary, eye system symptoms on review.



MENTAL STATUS EXAM:  Reasonably oriented.  Speech is coherent, abstraction fair,

computation impaired, language function intact.  Mood and affect is withdrawn,

but improved.



LABORATORY DATA:  Reviewed.



IMPRESSION:  Unchanged from initial note.



PLAN:  No change from initial note.





______________________________

MAN BRIAN JAY MD



DR:  JAYLEEN/ted  JOB#:  [id number] / [id number]

DD:  09/23/2019 21:52  DT:  09/24/2019 04:07

## 2019-09-24 NOTE — PDOC
Exam


Note:


Ashish Note:


Please also refer to the separate dictated note~for this date of service 

dictated separately.~Patient seen individually. Discussed the patient with 

Nursing staff reviewed the chart.~Reviewed interim history and current 

functioning. Reviewed vital signs,~Labs/ Radiology~and current medications noted

below. Continue current treatment with the changes noted in the dictated 

addendum note





Assessment:


Vital Signs/I&O:





                                   Vital Signs








  Date Time  Temp Pulse Resp B/P (MAP) Pulse Ox O2 Delivery O2 Flow Rate FiO2


 


9/24/19 16:59 98.2 101 16 110/73 (85) 98   














                                    I & O   


 


 9/23/19 9/23/19 9/24/19





 15:00 23:00 07:00


 


Intake Total 480 ml 240 ml 100 ml


 


Balance 480 ml 240 ml 100 ml











Current Medications:


I have reviewed the current psychotropics carefully including drug interactions.

 Risk benefit ratio favors no change other than as noted in my dictated progress

note.





Diagnosis:


Problems:  


(1) Anxiety disorder


(2) Major depressive disorder, recurrent episode


(3) Mild cognitive disorder











TERA JAY MD                 Sep 24, 2019 22:07

## 2019-09-24 NOTE — PN
DATE:  09/21/2019



PSYCHIATRIC PROGRESS NOTE



This late entry 09/21/2019 covers elements not covered in my initial note.



SUBJECTIVE:  I met with the patient evening of 09/21/2019 in her room.  Per

nursing report, the patient is compliant with meds and assessment.  She

ambulated self to dayroom for groups on her own, smiling at times, big change

for her per nursing report.  Still not drinking much when offered.



REVIEW OF SYSTEMS:  Positive for some tiredness.  No CV, , pulmonary, eye

system symptoms on review.



MENTAL STATUS EXAM:  The patient is reasonably oriented.  Speech is moderate

latency, often responses monosyllabic.  Abstraction fair, computation impaired,

language function intact, attention span short.  Mood and affect withdrawn, but

less so than before.



LABORATORY DATA:  Reviewed.



IMPRESSION:  Unchanged from initial note.



PLAN:  No change from initial note.  We will continue to gradually increase the

Luvox.  Maintain the Wellbutrin and Abilify.  Continue trazodone for insomnia.





______________________________

TERA JAY MD



DR:  JAYLEEN/ted  JOB#:  [id number] / [id number]

DD:  09/23/2019 21:18  DT:  09/24/2019 03:15

## 2019-09-25 VITALS — SYSTOLIC BLOOD PRESSURE: 121 MMHG | DIASTOLIC BLOOD PRESSURE: 82 MMHG

## 2019-09-25 VITALS — DIASTOLIC BLOOD PRESSURE: 80 MMHG | SYSTOLIC BLOOD PRESSURE: 127 MMHG

## 2019-09-25 RX ADMIN — MIRTAZAPINE SCH MG: 15 TABLET, FILM COATED ORAL at 20:55

## 2019-09-25 RX ADMIN — TRAZODONE HYDROCHLORIDE SCH MG: 50 TABLET, FILM COATED ORAL at 20:55

## 2019-09-25 RX ADMIN — BUPROPION HYDROCHLORIDE SCH MG: 300 TABLET, EXTENDED RELEASE ORAL at 09:32

## 2019-09-25 RX ADMIN — PANTOPRAZOLE SODIUM SCH MG: 40 TABLET, DELAYED RELEASE ORAL at 09:32

## 2019-09-25 NOTE — PDOC
Exam


Note:


Ashish Note:


Please also refer to the separate dictated note~for this date of service 

dictated separately.~Patient seen individually. Discussed the patient with 

Nursing staff reviewed the chart.~Reviewed interim history and current 

functioning. Reviewed vital signs,~Labs/ Radiology~and current medications noted

below. Continue current treatment with the changes noted in the dictated 

addendum note





Assessment:


Vital Signs/I&O:





                                   Vital Signs








  Date Time  Temp Pulse Resp B/P (MAP) Pulse Ox O2 Delivery O2 Flow Rate FiO2


 


9/25/19 17:14 97.9 83 16 127/80 (96) 100   














                                    I & O   


 


 9/24/19 9/24/19 9/25/19





 15:00 23:00 07:00


 


Intake Total 840 ml 580 ml 


 


Balance 840 ml 580 ml 











Current Medications:


Meds:





Current Medications








 Medications


  (Trade)  Dose


 Ordered  Sig/Huseyin


 Route


 PRN Reason  Start Time


 Stop Time Status Last Admin


Dose Admin


 


 Fluvoxamine


 Maleate


  (Luvox)  75 mg  DAILY


 PO


   9/25/19 09:00


    9/25/19 09:33











I have reviewed the current psychotropics carefully including drug interactions.

 Risk benefit ratio favors no change other than as noted in my dictated progress

note.





Diagnosis:


Problems:  


(1) Anxiety disorder


(2) Major depressive disorder, recurrent episode


(3) Mild cognitive disorder











TERA JAY MD                 Sep 25, 2019 21:49

## 2019-09-25 NOTE — PN
DATE:  09/23/2019



PSYCHIATRIC PROGRESS NOTE



This late entry 09/23/2019 covers elements not covered in my initial note.



SUBJECTIVE:  I met with the patient in the evening.  Overall, per nursing staff,

she has been coming out more for groups and activities, little more interactive,

still seems to withdraw to her room, and she can somewhat flatten her affect.



REVIEW OF SYSTEMS:  No CV, , pulmonary, eye, ENT system symptoms on review. 

Reliability fair.



MENTAL STATUS EXAM:  Reasonably oriented.  Speech is coherent, has some latency,

often responses monosyllabic.  Abstraction fair, computation impaired, language

function intact, attention span short.  Mood and affect somewhat withdrawn, but

improved.



LABORATORY DATA:  Reviewed.



IMPRESSION:  Major depressive disorder with psychotic features;

obsessive-compulsive disorder; anxiety disorder, unspecified.



PLAN:  Continue current psychotropics.  Luvox is being increased.  Maintain

Wellbutrin.  Augmentation of Luvox with Abilify.  Maintain Remeron.  Trazodone

at night for sleep; adjust further as clinically indicated.





______________________________

MAN BRIAN JAY MD



DR:  JAYLEEN/ted  JOB#:  [id number] / [id number]

DD:  09/24/2019 12:47  DT:  09/24/2019 23:26

## 2019-09-26 VITALS — DIASTOLIC BLOOD PRESSURE: 83 MMHG | SYSTOLIC BLOOD PRESSURE: 125 MMHG

## 2019-09-26 VITALS — SYSTOLIC BLOOD PRESSURE: 124 MMHG | DIASTOLIC BLOOD PRESSURE: 79 MMHG

## 2019-09-26 RX ADMIN — BUPROPION HYDROCHLORIDE SCH MG: 300 TABLET, EXTENDED RELEASE ORAL at 07:37

## 2019-09-26 RX ADMIN — MIRTAZAPINE SCH MG: 15 TABLET, FILM COATED ORAL at 20:56

## 2019-09-26 RX ADMIN — PANTOPRAZOLE SODIUM SCH MG: 40 TABLET, DELAYED RELEASE ORAL at 07:38

## 2019-09-26 RX ADMIN — TRAZODONE HYDROCHLORIDE SCH MG: 50 TABLET, FILM COATED ORAL at 20:56

## 2019-09-26 NOTE — PDOC
Exam


Note:


Ashish Note:


Please also refer to the separate dictated note~for this date of service 

dictated separately.~Patient seen individually. Discussed the patient with 

Nursing staff reviewed the chart.~Reviewed interim history and current 

functioning. Reviewed vital signs,~Labs/ Radiology~and current medications noted

below. Continue current treatment with the changes noted in the dictated 

addendum note





Assessment:


Vital Signs/I&O:





                                   Vital Signs








  Date Time  Temp Pulse Resp B/P (MAP) Pulse Ox O2 Delivery O2 Flow Rate FiO2


 


9/26/19 15:47 98.4 91 16 125/83 (97) 99   














                                    I & O   


 


 9/25/19 9/25/19 9/26/19





 15:00 23:00 07:00


 


Intake Total 480 ml 240 ml 240 ml


 


Balance 480 ml 240 ml 240 ml











Current Medications:


I have reviewed the current psychotropics carefully including drug interactions.

 Risk benefit ratio favors no change other than as noted in my dictated progress

note.





Diagnosis:


Problems:  


(1) Anxiety disorder


(2) Major depressive disorder, recurrent episode


(3) Mild cognitive disorder











TERA JAY MD                 Sep 26, 2019 21:55

## 2019-09-26 NOTE — PN
DATE:  09/24/2019



PSYCHIATRIC PROGRESS NOTE



This late entry 09/24/2019 covers the elements not covered in my initial note.



SUBJECTIVE:  I met with the patient in the evening at length in her room.  She

slept 8-1/2 hours previous night.  She has been quite withdrawn, did come to the

day room and the patio in the afternoon, which is an improvement.



REVIEW OF SYSTEMS:  No CV, , pulmonary, eye, ENT system symptoms on review.



MENTAL STATUS EXAM:  Reasonably oriented.  Speech moderate.  Latency, often

responses monosyllabic.  Abstraction fair, computation impaired, language

function intact, attention span short.  Mood and affect withdrawn, but less so

than before.



LABORATORY DATA:  Reviewed.



IMPRESSION:  Unchanged from initial note.



PLAN:  No change from initial note.





______________________________

MAN BRIAN JAY MD



DR:  JAYLEEN/ted  JOB#:  [id number] / [id number]

DD:  09/25/2019 15:45  DT:  09/26/2019 00:50

## 2019-09-27 VITALS — SYSTOLIC BLOOD PRESSURE: 121 MMHG | DIASTOLIC BLOOD PRESSURE: 77 MMHG

## 2019-09-27 VITALS — DIASTOLIC BLOOD PRESSURE: 77 MMHG | SYSTOLIC BLOOD PRESSURE: 156 MMHG

## 2019-09-27 VITALS — DIASTOLIC BLOOD PRESSURE: 78 MMHG | SYSTOLIC BLOOD PRESSURE: 128 MMHG

## 2019-09-27 RX ADMIN — BUPROPION HYDROCHLORIDE SCH MG: 300 TABLET, EXTENDED RELEASE ORAL at 09:03

## 2019-09-27 RX ADMIN — CHOLECALCIFEROL CAP 1.25 MG (50000 UNIT) SCH UNIT: 1.25 CAP at 09:04

## 2019-09-27 RX ADMIN — PANTOPRAZOLE SODIUM SCH MG: 40 TABLET, DELAYED RELEASE ORAL at 09:03

## 2019-09-27 RX ADMIN — MIRTAZAPINE SCH MG: 15 TABLET, FILM COATED ORAL at 21:19

## 2019-09-27 RX ADMIN — TRAZODONE HYDROCHLORIDE SCH MG: 50 TABLET, FILM COATED ORAL at 21:20

## 2019-09-27 NOTE — EKG
[facility] [address], [place] [address]

Test Date:    2019               Test Time:    18:58:46

Pat Name:     JESENIA SPIVEY         Department:   

Patient ID:   [id number]           Room:         [address] [place] [address]

Gender:       F                        Technician:   

:          1955               Requested By: JENNIFER YATES

Order Number: [id number]            Reading MD:   Moreno Donovan MD

                                 Measurements

Intervals                              Axis          

Rate:         97                       P:            73

RI:           152                      QRS:          45

QRSD:         84                       T:            75

QT:           352                                    

QTc:          451                                    

                           Interpretive Statements

SINUS RHYTHM

CONSIDER ANTEROSEPTAL INFARCT

Electronically Signed On 10-8-2019 10:14:28 CDT by Moreno Donovan MD

## 2019-09-27 NOTE — RAD
PQRS Compliance statement:

 

One or more of the following individualized dose reduction techniques were

utilized for this examination:

1. Automated exposure control.

2. Adjustment of the mA and/or kV according to patient size.

3. Use of iterative reconstruction technique.

 

Indication:Fall with right forearm hematoma.

 

TECHNIQUE: CT head without IV contrast

 

COMPARISON: None

 

FINDINGS:

No pathologic extra-axial or intra-axial fluid collection. The ventricles 

and basal cisterns are within normal limits. No acute intracranial bleed. 

Small right frontal scalp hematoma measuring 8 mm in thickness. Visualized

orbits are within normal limits. No acute calvarial fractures. Visualized 

paranasal sinuses and mastoid air cells are clear.

 

IMPRESSION:

No acute intracranial bleed or calvarial fracture.

Small right anterior frontal scalp hematoma.

 

Electronically signed by: David Spencer DO (9/27/2019 6:48 PM) [facility]

## 2019-09-27 NOTE — PDOC
Exam


Note:


Ashish Note:


Please also refer to the separate dictated note~for this date of service 

dictated separately.~Patient seen individually. Discussed the patient with 

Nursing staff reviewed the chart.~Reviewed interim history and current 

functioning. Reviewed vital signs,~Labs/ Radiology~and current medications noted

below. Continue current treatment with the changes noted in the dictated 

addendum note





Assessment:


Vital Signs/I&O:





                                   Vital Signs








  Date Time  Temp Pulse Resp B/P (MAP) Pulse Ox O2 Delivery O2 Flow Rate FiO2


 


9/27/19 18:05 98.0 126  156/77 (103)    


 


9/27/19 16:29   18  98   














                                    I & O   


 


 9/26/19 9/26/19 9/27/19





 15:00 23:00 07:00


 


Intake Total 600 ml 240 ml 120 ml


 


Balance 600 ml 240 ml 120 ml











Current Medications:


I have reviewed the current psychotropics carefully including drug interactions.

 Risk benefit ratio favors no change other than as noted in my dictated progress

note.





Diagnosis:


Problems:  


(1) Anxiety disorder


(2) Major depressive disorder, recurrent episode


(3) Mild cognitive disorder











TERA JAY MD                 Sep 27, 2019 21:46

## 2019-09-27 NOTE — PN
DATE:  09/25/2019



PSYCHIATRIC PROGRESS NOTE



This late entry 09/25/2019 covers the elements not covered in my initial note.



SUBJECTIVE:  I met with the patient in the evening.  According to NIGEL Bradford, the

patient slept 7 hours previous night.  Somewhat withdrawn to her room after

breakfast, but otherwise coming out a little bit more for groups.



REVIEW OF SYSTEMS:  No CV, , pulmonary, eye system symptoms on review. 

Reliability fair.  She has difficulty chewing, does not have dentures.



MENTAL STATUS EXAM:  Reasonably oriented.  Speech has some latency, coherent,

often responses monosyllabic.  Abstraction fair, computation impaired, language

function intact.  Mood and affect still depressed, but better than before.



LABORATORY DATA:  Reviewed.



IMPRESSION:  Unchanged from initial note.



PLAN:  No change from initial note.





______________________________

MAN BRIAN JAY MD



DR:  JAYLEEN/ted  JOB#:  [id number] / [id number]

DD:  09/27/2019 12:09  DT:  09/27/2019 22:11

## 2019-09-28 VITALS — DIASTOLIC BLOOD PRESSURE: 83 MMHG | SYSTOLIC BLOOD PRESSURE: 138 MMHG

## 2019-09-28 VITALS — SYSTOLIC BLOOD PRESSURE: 136 MMHG | DIASTOLIC BLOOD PRESSURE: 80 MMHG

## 2019-09-28 LAB
ALBUMIN SERPL-MCNC: 3.5 G/DL (ref 3.4–5)
ALBUMIN/GLOB SERPL: 0.9 {RATIO} (ref 1–1.7)
ALP SERPL-CCNC: 76 U/L (ref 46–116)
ALT SERPL-CCNC: 14 U/L (ref 14–59)
ANION GAP SERPL CALC-SCNC: 13 MMOL/L (ref 6–14)
AST SERPL-CCNC: 16 U/L (ref 15–37)
BASOPHILS # BLD AUTO: 0 X10^3/UL (ref 0–0.2)
BASOPHILS NFR BLD: 1 % (ref 0–3)
BILIRUB SERPL-MCNC: 0.3 MG/DL (ref 0.2–1)
BUN/CREAT SERPL: 11 (ref 6–20)
CA-I SERPL ISE-MCNC: 11 MG/DL (ref 7–20)
CALCIUM SERPL-MCNC: 9.2 MG/DL (ref 8.5–10.1)
CHLORIDE SERPL-SCNC: 104 MMOL/L (ref 98–107)
CO2 SERPL-SCNC: 24 MMOL/L (ref 21–32)
CREAT SERPL-MCNC: 1 MG/DL (ref 0.6–1)
EOSINOPHIL NFR BLD: 0.1 X10^3/UL (ref 0–0.7)
EOSINOPHIL NFR BLD: 1 % (ref 0–3)
ERYTHROCYTE [DISTWIDTH] IN BLOOD BY AUTOMATED COUNT: 13.4 % (ref 11.5–14.5)
GFR SERPLBLD BASED ON 1.73 SQ M-ARVRAT: 55.6 ML/MIN
GLOBULIN SER-MCNC: 3.8 G/DL (ref 2.2–3.8)
GLUCOSE SERPL-MCNC: 101 MG/DL (ref 70–99)
HCT VFR BLD CALC: 37.8 % (ref 36–47)
HGB BLD-MCNC: 12.8 G/DL (ref 12–15.5)
LYMPHOCYTES # BLD: 1.1 X10^3/UL (ref 1–4.8)
LYMPHOCYTES NFR BLD AUTO: 23 % (ref 24–48)
MCH RBC QN AUTO: 30 PG (ref 25–35)
MCHC RBC AUTO-ENTMCNC: 34 G/DL (ref 31–37)
MCV RBC AUTO: 89 FL (ref 79–100)
MONO #: 0.3 X10^3/UL (ref 0–1.1)
MONOCYTES NFR BLD: 7 % (ref 0–9)
NEUT #: 3.1 X10^3UL (ref 1.8–7.7)
NEUTROPHILS NFR BLD AUTO: 68 % (ref 31–73)
PLATELET # BLD AUTO: 328 X10^3/UL (ref 140–400)
POTASSIUM SERPL-SCNC: 4.1 MMOL/L (ref 3.5–5.1)
PROT SERPL-MCNC: 7.3 G/DL (ref 6.4–8.2)
RBC # BLD AUTO: 4.24 X10^6/UL (ref 3.5–5.4)
SODIUM SERPL-SCNC: 141 MMOL/L (ref 136–145)
WBC # BLD AUTO: 4.6 X10^3/UL (ref 4–11)

## 2019-09-28 RX ADMIN — TRAZODONE HYDROCHLORIDE SCH MG: 50 TABLET, FILM COATED ORAL at 21:19

## 2019-09-28 RX ADMIN — MIRTAZAPINE SCH MG: 15 TABLET, FILM COATED ORAL at 21:18

## 2019-09-28 RX ADMIN — ACETAMINOPHEN PRN MG: 325 TABLET, FILM COATED ORAL at 19:11

## 2019-09-28 RX ADMIN — BUPROPION HYDROCHLORIDE SCH MG: 300 TABLET, EXTENDED RELEASE ORAL at 09:15

## 2019-09-28 RX ADMIN — ACETAMINOPHEN PRN MG: 325 TABLET, FILM COATED ORAL at 03:37

## 2019-09-28 RX ADMIN — PANTOPRAZOLE SODIUM SCH MG: 40 TABLET, DELAYED RELEASE ORAL at 09:15

## 2019-09-28 NOTE — PDOC
Exam


Note:


Ashish Note:


Please also refer to the separate dictated note~for this date of service 

dictated separately.~Patient seen individually. Discussed the patient with 

Nursing staff reviewed the chart.~Reviewed interim history and current 

functioning. Reviewed vital signs,~Labs/ Radiology~and current medications noted

below. Continue current treatment with the changes noted in the dictated 

addendum note





Assessment:


Vital Signs/I&O:





                                   Vital Signs








  Date Time  Temp Pulse Resp B/P (MAP) Pulse Ox O2 Delivery O2 Flow Rate FiO2


 


9/28/19 15:35 98.6 90 18 138/83 (101) 99   














                                    I & O   


 


 9/27/19 9/27/19 9/28/19





 15:00 23:00 07:00


 


Intake Total 960 ml 480 ml 240 ml


 


Balance 960 ml 480 ml 240 ml








Labs:





                                Laboratory Tests








Test


 9/28/19


07:48


 


White Blood Count


 4.6 x10^3/uL


(4.0-11.0)


 


Red Blood Count


 4.24 x10^6/uL


(3.50-5.40)


 


Hemoglobin


 12.8 g/dL


(12.0-15.5)


 


Hematocrit


 37.8 %


(36.0-47.0)


 


Mean Corpuscular Volume


 89 fL ()





 


Mean Corpuscular Hemoglobin 30 pg (25-35)  


 


Mean Corpuscular Hemoglobin


Concent 34 g/dL


(31-37)


 


Red Cell Distribution Width


 13.4 %


(11.5-14.5)


 


Platelet Count


 328 x10^3/uL


(140-400)


 


Neutrophils (%) (Auto) 68 % (31-73)  


 


Lymphocytes (%) (Auto) 23 % (24-48)  L


 


Monocytes (%) (Auto) 7 % (0-9)  


 


Eosinophils (%) (Auto) 1 % (0-3)  


 


Basophils (%) (Auto) 1 % (0-3)  


 


Neutrophils # (Auto)


 3.1 x10^3uL


(1.8-7.7)


 


Lymphocytes # (Auto)


 1.1 x10^3/uL


(1.0-4.8)


 


Monocytes # (Auto)


 0.3 x10^3/uL


(0.0-1.1)


 


Eosinophils # (Auto)


 0.1 x10^3/uL


(0.0-0.7)


 


Basophils # (Auto)


 0.0 x10^3/uL


(0.0-0.2)


 


Sodium Level


 141 mmol/L


(136-145)


 


Potassium Level


 4.1 mmol/L


(3.5-5.1)


 


Chloride Level


 104 mmol/L


()


 


Carbon Dioxide Level


 24 mmol/L


(21-32)


 


Anion Gap 13 (6-14)  


 


Blood Urea Nitrogen


 11 mg/dL


(7-20)


 


Creatinine


 1.0 mg/dL


(0.6-1.0)


 


Estimated GFR


(Cockcroft-Gault) 55.6  





 


BUN/Creatinine Ratio 11 (6-20)  


 


Glucose Level


 101 mg/dL


(70-99)  H


 


Calcium Level


 9.2 mg/dL


(8.5-10.1)


 


Total Bilirubin


 0.3 mg/dL


(0.2-1.0)


 


Aspartate Amino Transferase


(AST) 16 U/L (15-37)





 


Alanine Aminotransferase (ALT)


 14 U/L (14-59)





 


Alkaline Phosphatase


 76 U/L


()


 


Total Protein


 7.3 g/dL


(6.4-8.2)


 


Albumin


 3.5 g/dL


(3.4-5.0)


 


Albumin/Globulin Ratio


 0.9 (1.0-1.7)


L











Current Medications:


Meds:





Current Medications








 Medications


  (Trade)  Dose


 Ordered  Sig/Huseyin


 Route


 PRN Reason  Start Time


 Stop Time Status Last Admin


Dose Admin


 


 Lorazepam


  (Ativan)  0.25 mg  PRN Q4HRS  PRN


 PO


 ANXIETY / AGITATION  9/28/19 21:15


    9/28/19 21:19











I have reviewed the current psychotropics carefully including drug interactions.

 Risk benefit ratio favors no change other than as noted in my dictated progress

note.





Diagnosis:


Problems:  


(1) Anxiety disorder


(2) Major depressive disorder, recurrent episode


(3) Mild cognitive disorder











TERA JAY MD                 Sep 28, 2019 22:19

## 2019-09-28 NOTE — PN
DATE:  09/26/2019



PSYCHIATRIC PROGRESS NOTE



This late entry 09/26/2019 covers elements not covered in my initial note.



SUBJECTIVE:  I met with the patient in the evening.  The patient was also

staffed at a treatment team meeting with the entire team in the morning.  The

patient is sleeping well, appetite is a little better.  She has been attending

groups, quite an improvement for her.



REVIEW OF SYSTEMS:  No CV, , pulmonary, eye, ENT system symptoms on review.



MENTAL STATUS EXAM:  Oriented to herself and situation.  Speech has some

latency, coherent, often responses monosyllabic.  Abstraction fair, computation

impaired, language function intact.  Mood and affect is withdrawn, but less so

than before.



LABORATORY DATA:  Reviewed.



IMPRESSION:  Unchanged from initial note.



PLAN:  No change from initial note.





______________________________

MAN BRIAN JAY MD



DR:  JAYLEEN/ted  JOB#:  [id number] / [id number]

DD:  09/27/2019 12:49  DT:  09/27/2019 22:49

## 2019-09-29 VITALS — SYSTOLIC BLOOD PRESSURE: 143 MMHG | DIASTOLIC BLOOD PRESSURE: 84 MMHG

## 2019-09-29 VITALS — DIASTOLIC BLOOD PRESSURE: 89 MMHG | SYSTOLIC BLOOD PRESSURE: 148 MMHG

## 2019-09-29 RX ADMIN — PANTOPRAZOLE SODIUM SCH MG: 40 TABLET, DELAYED RELEASE ORAL at 09:43

## 2019-09-29 RX ADMIN — ACETAMINOPHEN PRN MG: 325 TABLET, FILM COATED ORAL at 20:34

## 2019-09-29 RX ADMIN — BUPROPION HYDROCHLORIDE SCH MG: 300 TABLET, EXTENDED RELEASE ORAL at 09:44

## 2019-09-29 RX ADMIN — ACETAMINOPHEN PRN MG: 325 TABLET, FILM COATED ORAL at 09:48

## 2019-09-29 RX ADMIN — ACETAMINOPHEN PRN MG: 325 TABLET, FILM COATED ORAL at 05:14

## 2019-09-29 RX ADMIN — TRAZODONE HYDROCHLORIDE SCH MG: 50 TABLET, FILM COATED ORAL at 20:32

## 2019-09-29 RX ADMIN — MIRTAZAPINE SCH MG: 15 TABLET, FILM COATED ORAL at 20:32

## 2019-09-29 NOTE — PDOC
Exam


Note:


Ashish Note:


Please also refer to the separate dictated note~for this date of service 

dictated separately.~Patient seen individually. Discussed the patient with 

Nursing staff reviewed the chart.~Reviewed interim history and current 

functioning. Reviewed vital signs,~Labs/ Radiology~and current medications noted

below. Continue current treatment with the changes noted in the dictated 

addendum note





Assessment:


Vital Signs/I&O:





                                   Vital Signs








  Date Time  Temp Pulse Resp B/P (MAP) Pulse Ox O2 Delivery O2 Flow Rate FiO2


 


9/29/19 16:09 98.3 112 20 143/84 (103) 98   


 


9/29/19 05:30      Room Air  














                                    I & O   


 


 9/28/19 9/28/19 9/29/19





 15:00 23:00 07:00


 


Intake Total 240 ml 240 ml 120 ml


 


Balance 240 ml 240 ml 120 ml











Current Medications:


Meds:





Current Medications








 Medications


  (Trade)  Dose


 Ordered  Sig/Huseyin


 Route


 PRN Reason  Start Time


 Stop Time Status Last Admin


Dose Admin


 


 Clonazepam


  (KlonoPIN)  0.25 mg  TID PRN  PRN


 PO


 ANXIETY / AGITATION  9/29/19 11:30


    9/29/19 21:14











I have reviewed the current psychotropics carefully including drug interactions.

 Risk benefit ratio favors no change other than as noted in my dictated progress

note.





Diagnosis:


Problems:  


(1) Anxiety disorder


(2) Major depressive disorder, recurrent episode


(3) Mild cognitive disorder











TERA JAY MD                 Sep 29, 2019 21:33

## 2019-09-30 VITALS — DIASTOLIC BLOOD PRESSURE: 85 MMHG | SYSTOLIC BLOOD PRESSURE: 133 MMHG

## 2019-09-30 VITALS — SYSTOLIC BLOOD PRESSURE: 116 MMHG | DIASTOLIC BLOOD PRESSURE: 77 MMHG

## 2019-09-30 RX ADMIN — MIRTAZAPINE SCH MG: 15 TABLET, FILM COATED ORAL at 19:31

## 2019-09-30 RX ADMIN — ACETAMINOPHEN PRN MG: 325 TABLET, FILM COATED ORAL at 19:28

## 2019-09-30 RX ADMIN — ACETAMINOPHEN PRN MG: 325 TABLET, FILM COATED ORAL at 15:30

## 2019-09-30 RX ADMIN — TRAZODONE HYDROCHLORIDE SCH MG: 50 TABLET, FILM COATED ORAL at 19:31

## 2019-09-30 RX ADMIN — PANTOPRAZOLE SODIUM SCH MG: 40 TABLET, DELAYED RELEASE ORAL at 08:00

## 2019-09-30 RX ADMIN — BUPROPION HYDROCHLORIDE SCH MG: 300 TABLET, EXTENDED RELEASE ORAL at 08:01

## 2019-09-30 NOTE — PDOC
Exam


Note:


Ashish Note:


Please also refer to the separate dictated note~for this date of service 

dictated separately.~Patient seen individually. Discussed the patient with 

Nursing staff reviewed the chart.~Reviewed interim history and current 

functioning. Reviewed vital signs,~Labs/ Radiology~and current medications noted

below. Continue current treatment with the changes noted in the dictated 

addendum note





Assessment:


Vital Signs/I&O:





                                   Vital Signs








  Date Time  Temp Pulse Resp B/P (MAP) Pulse Ox O2 Delivery O2 Flow Rate FiO2


 


9/30/19 16:04 97.8 110 20 133/85 (101) 97   


 


9/30/19 06:03      Room Air  














                                    I & O   


 


 9/29/19 9/29/19 9/30/19





 15:00 23:00 07:00


 


Intake Total 840 ml 480 ml 


 


Balance 840 ml 480 ml 











Current Medications:


Meds:





Current Medications








 Medications


  (Trade)  Dose


 Ordered  Sig/Huseyin


 Route


 PRN Reason  Start Time


 Stop Time Status Last Admin


Dose Admin


 


 Influenza Virus


 Vaccine Quadrival


  (Afluria Quad


 2019-20 (3yr Up)


 Syringe)  0.5 ml  ONCE ONCE


 VAX IM


   9/30/19 12:45


 9/30/19 12:46 DC 9/30/19 17:39











I have reviewed the current psychotropics carefully including drug interactions.

 Risk benefit ratio favors no change other than as noted in my dictated progress

note.





Diagnosis:


Problems:  


(1) Anxiety disorder


(2) Major depressive disorder, recurrent episode


(3) Mild cognitive disorder











TERA JAY MD                 Sep 30, 2019 21:43

## 2019-09-30 NOTE — PN
DATE:  09/27/2019



PSYCHIATRIC PROGRESS NOTE



This late entry 09/27/2019 covers elements not covered in my initial note.



SUBJECTIVE:  I met with the patient in the evening.  Per NIGEL Arechiga, the

patient slept 6-3/4 hours previous night, has been compliant with medications. 

States her roommate kept her up at night.  She has been somewhat isolative in

the morning, came out later in the day.  Appetite 75%.  Weight was 42 kilograms

at admission, 46.38 on 09/27/2019.



REVIEW OF SYSTEMS:  Positive for some tiredness.  No CV, , pulmonary, eye, ENT

system symptoms on review.  Reliability fair.



MENTAL STATUS EXAM:  Reasonably oriented.  Speech is coherent, abstraction fair,

computation impaired, language function intact.  Mood and affect somewhat

withdrawn.



LABORATORY DATA:  Reviewed.



IMPRESSION:  Unchanged from initial note.



PLAN:  No change from initial note.





______________________________

MAN BRIAN JAY MD



DR:  JAYLEEN/ted  JOB#:  [id number] / [id number]

DD:  09/29/2019 16:53  DT:  09/30/2019 00:27

## 2019-10-01 VITALS — SYSTOLIC BLOOD PRESSURE: 133 MMHG | DIASTOLIC BLOOD PRESSURE: 77 MMHG

## 2019-10-01 VITALS — DIASTOLIC BLOOD PRESSURE: 81 MMHG | SYSTOLIC BLOOD PRESSURE: 102 MMHG

## 2019-10-01 RX ADMIN — TRAZODONE HYDROCHLORIDE SCH MG: 50 TABLET, FILM COATED ORAL at 19:28

## 2019-10-01 RX ADMIN — PANTOPRAZOLE SODIUM SCH MG: 40 TABLET, DELAYED RELEASE ORAL at 08:54

## 2019-10-01 RX ADMIN — MIRTAZAPINE SCH MG: 15 TABLET, FILM COATED ORAL at 19:29

## 2019-10-01 RX ADMIN — BUPROPION HYDROCHLORIDE SCH MG: 300 TABLET, EXTENDED RELEASE ORAL at 08:54

## 2019-10-01 RX ADMIN — ACETAMINOPHEN PRN MG: 325 TABLET, FILM COATED ORAL at 19:28

## 2019-10-01 RX ADMIN — ACETAMINOPHEN PRN MG: 325 TABLET, FILM COATED ORAL at 05:13

## 2019-10-01 NOTE — PN
DATE:  09/29/2019



PSYCHIATRIC PROGRESS NOTE



This late entry 09/29/2019 covers elements not covered in my initial note.



SUBJECTIVE:  I met with the patient in the evening.  The patient slept 5-1/2

hours previous night.  Overall, she has been coming out more for activities,

less isolative.



REVIEW OF SYSTEMS:  No CV, , pulmonary, eye, ENT system symptoms on review.



MENTAL STATUS EXAMINATION:  Oriented to herself and situation.  Speech moderate

latency, often responses monosyllabic.  Abstraction fair, computation impaired,

language function intact, attention span short.  Mood and affect less withdrawn.



LABORATORY DATA:  Reviewed.



IMPRESSION:  Unchanged from initial note.



PLAN:  No change from initial note.





______________________________

TERA JAY MD



DR:  JAYLEEN/ted  JOB#:  [id number] / [id number]

DD:  09/30/2019 12:12  DT:  10/01/2019 00:44

## 2019-10-01 NOTE — PN
DATE:  09/28/2019



PSYCHIATRIC PROGRESS NOTE



This late entry, 09/28/2019, covers elements not covered in my initial note.



SUBJECTIVE:  I met with the patient in the evening.  The patient had a fall the

previous evening and has a periorbital ecchymosis.  She slept 5 hours previous

night.  She has been coming out more to the day room:  I will defer labs to Dr. Rudolph and receive the report from NIGEL Monae.



REVIEW OF SYSTEMS:  No CV, , pulmonary, eye, ENT system symptoms on review. 

Reliability fair.



MENTAL STATUS EXAM:  Reasonably oriented.  Speech is coherent, abstraction fair,

computation impaired, language function intact, attention span short.  Mood and

affect is less withdrawn.



LABORATORY DATA:  Reviewed.



IMPRESSION:  Unchanged from initial note.



PLAN:  No change from initial note.





______________________________

MAN BRIAN JAY MD



DR:  JAYLEEN/ted  JOB#:  [id number] / [id number]

DD:  09/30/2019 12:11  DT:  10/01/2019 00:37

## 2019-10-01 NOTE — PDOC
Exam


Note:


Ashish Note:


Please also refer to the separate dictated note~for this date of service 

dictated separately.~Patient seen individually. Discussed the patient with 

Nursing staff reviewed the chart.~Reviewed interim history and current 

functioning. Reviewed vital signs,~Labs/ Radiology~and current medications noted

below. Continue current treatment with the changes noted in the dictated 

addendum note





Assessment:


Vital Signs/I&O:





                                   Vital Signs








  Date Time  Temp Pulse Resp B/P (MAP) Pulse Ox O2 Delivery O2 Flow Rate FiO2


 


10/1/19 16:38 97.4 86 16 102/81 (88) 96   


 


9/30/19 06:03      Room Air  














                                    I & O   


 


 9/30/19 9/30/19 10/1/19





 15:00 23:00 07:00


 


Intake Total 480 ml 240 ml 100 ml


 


Balance 480 ml 240 ml 100 ml











Current Medications:


I have reviewed the current psychotropics carefully including drug interactions.

 Risk benefit ratio favors no change other than as noted in my dictated progress

note.





Diagnosis:


Problems:  


(1) Anxiety disorder


(2) Major depressive disorder, recurrent episode


(3) Mild cognitive disorder











TERA JAY MD                  Oct 1, 2019 22:16

## 2019-10-02 VITALS — DIASTOLIC BLOOD PRESSURE: 65 MMHG | SYSTOLIC BLOOD PRESSURE: 99 MMHG

## 2019-10-02 VITALS — SYSTOLIC BLOOD PRESSURE: 117 MMHG | DIASTOLIC BLOOD PRESSURE: 73 MMHG

## 2019-10-02 RX ADMIN — MIRTAZAPINE SCH MG: 15 TABLET, FILM COATED ORAL at 19:16

## 2019-10-02 RX ADMIN — TRAZODONE HYDROCHLORIDE SCH MG: 50 TABLET, FILM COATED ORAL at 19:16

## 2019-10-02 RX ADMIN — ACETAMINOPHEN PRN MG: 325 TABLET, FILM COATED ORAL at 09:53

## 2019-10-02 RX ADMIN — PANTOPRAZOLE SODIUM SCH MG: 40 TABLET, DELAYED RELEASE ORAL at 07:58

## 2019-10-02 RX ADMIN — BUPROPION HYDROCHLORIDE SCH MG: 300 TABLET, EXTENDED RELEASE ORAL at 07:58

## 2019-10-02 RX ADMIN — ACETAMINOPHEN PRN MG: 325 TABLET, FILM COATED ORAL at 19:23

## 2019-10-02 NOTE — PDOC
Exam


Note:


Ashish Note:


Please also refer to the separate dictated note~for this date of service 

dictated separately.~Patient seen individually. Discussed the patient with 

Nursing staff reviewed the chart.~Reviewed interim history and current 

functioning. Reviewed vital signs,~Labs/ Radiology~and current medications noted

below. Continue current treatment with the changes noted in the dictated 

addendum note





Assessment:


Vital Signs/I&O:





                                   Vital Signs








  Date Time  Temp Pulse Resp B/P (MAP) Pulse Ox O2 Delivery O2 Flow Rate FiO2


 


10/2/19 16:08 97.5 99 20 99/65 (76) 97   


 


9/30/19 06:03      Room Air  














                                    I & O   


 


 10/1/19 10/1/19 10/2/19





 14:59 22:59 06:59


 


Intake Total 720 ml 240 ml 120 ml


 


Balance 720 ml 240 ml 120 ml











Current Medications:


I have reviewed the current psychotropics carefully including drug interactions.

 Risk benefit ratio favors no change other than as noted in my dictated progress

note.





Diagnosis:


Problems:  


(1) Anxiety disorder


(2) Major depressive disorder, recurrent episode


(3) Mild cognitive disorder











TERA JAY MD                  Oct 2, 2019 21:30

## 2019-10-03 VITALS — SYSTOLIC BLOOD PRESSURE: 102 MMHG | DIASTOLIC BLOOD PRESSURE: 69 MMHG

## 2019-10-03 VITALS — SYSTOLIC BLOOD PRESSURE: 101 MMHG | DIASTOLIC BLOOD PRESSURE: 65 MMHG

## 2019-10-03 RX ADMIN — BUPROPION HYDROCHLORIDE SCH MG: 300 TABLET, EXTENDED RELEASE ORAL at 07:13

## 2019-10-03 RX ADMIN — ACETAMINOPHEN PRN MG: 325 TABLET, FILM COATED ORAL at 12:24

## 2019-10-03 RX ADMIN — MIRTAZAPINE SCH MG: 15 TABLET, FILM COATED ORAL at 20:04

## 2019-10-03 RX ADMIN — PANTOPRAZOLE SODIUM SCH MG: 40 TABLET, DELAYED RELEASE ORAL at 07:12

## 2019-10-03 RX ADMIN — TRAZODONE HYDROCHLORIDE SCH MG: 50 TABLET, FILM COATED ORAL at 20:04

## 2019-10-03 NOTE — PN
DATE:  10/01/2019



PSYCHIATRIC PROGRESS NOTE



This late entry, 10/01, covers elements not covered in my initial note.



SUBJECTIVE:  I met with the patient evening of 10/01.  The patient slept 8-1/4

hours per nursing report by NIEGL Lyons.  She has been interactive, cooperative,

more animated, smiling as I met with her individually, did well previous night

and during the day on 10/01.  No CV, , pulmonary, eye system symptoms on

review.  She has been using the wheelchair a little bit more because she does

not want to have another fall like she did a few days back resulting in a right

periorbital ecchymosis.



REVIEW OF SYSTEMS:  No CV, , eye, ENT system symptoms on review.



MENTAL STATUS EXAM:  Oriented reasonably.  Speech is coherent, abstraction fair,

computation impaired, language function intact.  Mood and affect is improved.



LABORATORY DATA:  Reviewed.



IMPRESSION:  Unchanged from initial note.



PLAN:  No change from initial note.





______________________________

TERA JAY MD



DR:  JAYLEEN/ted  JOB#:  [id number] / [id number]

DD:  10/02/2019 17:11  DT:  10/03/2019 06:34

## 2019-10-03 NOTE — PDOC
Exam


Note:


Ashish Note:


Please also refer to the separate dictated note~for this date of service 

dictated separately.~Patient seen individually. Discussed the patient with 

Nursing staff reviewed the chart.~Reviewed interim history and current 

functioning. Reviewed vital signs,~Labs/ Radiology~and current medications noted

below. Continue current treatment with the changes noted in the dictated 

addendum note





Assessment:


Vital Signs/I&O:





                                   Vital Signs








  Date Time  Temp Pulse Resp B/P (MAP) Pulse Ox O2 Delivery O2 Flow Rate FiO2


 


10/3/19 17:00 98.1 103 22 102/69 (80) 95   


 


9/30/19 06:03      Room Air  














                                    I & O   


 


 10/2/19 10/2/19 10/3/19





 15:00 23:00 07:00


 


Intake Total 1200 ml 360 ml 120 ml


 


Balance 1200 ml 360 ml 120 ml











Current Medications:


I have reviewed the current psychotropics carefully including drug interactions.

 Risk benefit ratio favors no change other than as noted in my dictated progress

note.





Diagnosis:


Problems:  


(1) Anxiety disorder


(2) Major depressive disorder, recurrent episode











TERA JAY MD                  Oct 3, 2019 21:49

## 2019-10-04 VITALS — SYSTOLIC BLOOD PRESSURE: 101 MMHG | DIASTOLIC BLOOD PRESSURE: 67 MMHG

## 2019-10-04 VITALS — DIASTOLIC BLOOD PRESSURE: 68 MMHG | SYSTOLIC BLOOD PRESSURE: 104 MMHG

## 2019-10-04 RX ADMIN — MIRTAZAPINE SCH MG: 15 TABLET, FILM COATED ORAL at 19:22

## 2019-10-04 RX ADMIN — TRAZODONE HYDROCHLORIDE SCH MG: 50 TABLET, FILM COATED ORAL at 19:21

## 2019-10-04 RX ADMIN — PANTOPRAZOLE SODIUM SCH MG: 40 TABLET, DELAYED RELEASE ORAL at 07:37

## 2019-10-04 RX ADMIN — BUPROPION HYDROCHLORIDE SCH MG: 300 TABLET, EXTENDED RELEASE ORAL at 07:39

## 2019-10-04 RX ADMIN — CHOLECALCIFEROL CAP 1.25 MG (50000 UNIT) SCH UNIT: 1.25 CAP at 07:40

## 2019-10-04 RX ADMIN — ACETAMINOPHEN PRN MG: 325 TABLET, FILM COATED ORAL at 09:45

## 2019-10-04 RX ADMIN — ACETAMINOPHEN PRN MG: 325 TABLET, FILM COATED ORAL at 20:35

## 2019-10-04 NOTE — PN
DATE:  10/03/2019



PSYCHIATRIC  PROGRESS NOTE



This late entry 10/03/2019 covers elements not covered in my initial note.



SUBJECTIVE:  I met with the patient evening of 10/03/2019 and the patient was

staffed at a treatment team meeting with the entire team in the morning.  She

complains of some lower extremity weakness.  Requests a Neurology consult.  We

will request with Dr. Lew.  She has been more interactive in groups singing. 

Slept 6-1/2 hours.  Appetite 75%, has gained 2 pounds weight, more interactive,

compliant with meds and smiling.



REVIEW OF SYSTEMS:  Positive for some tiredness.  No CV, , pulmonary, eye, ENT

system symptoms on review.



MENTAL STATUS EXAM:  Reasonably oriented.  Speech is coherent, has some latency.

 Abstraction fair, computation impaired, language function intact, attention

span short.  Mood and affect improved.



LABORATORY DATA:  Reviewed.



IMPRESSION:  Unchanged from initial note.



PLAN:  No change from initial note with possible discharge to outpatient

treatment Saturday 10/05/2019.





______________________________

MAN BRIAN JAY MD



DR:  JAYLEEN/ted  JOB#:  [id number] / [id number]

DD:  10/04/2019 13:13  DT:  10/04/2019 20:36

## 2019-10-04 NOTE — PDOC
Exam


Note:


Ashish Note:


Please also refer to the separate dictated note~for this date of service 

dictated separately.~Patient seen individually. Discussed the patient with 

Nursing staff reviewed the chart.~Reviewed interim history and current 

functioning. Reviewed vital signs,~Labs/ Radiology~and current medications noted

below. Continue current treatment with the changes noted in the dictated 

addendum note





Assessment:


Vital Signs/I&O:





                                   Vital Signs








  Date Time  Temp Pulse Resp B/P (MAP) Pulse Ox O2 Delivery O2 Flow Rate FiO2


 


10/4/19 16:24 98.5 93 18 104/68 (80) 96   


 


10/4/19 06:24      Room Air  














                                    I & O   


 


 10/3/19 10/3/19 10/4/19





 15:00 23:00 07:00


 


Intake Total 960 ml  


 


Balance 960 ml  











Current Medications:


I have reviewed the current psychotropics carefully including drug interactions.

 Risk benefit ratio favors no change other than as noted in my dictated progress

note.





Diagnosis:


Problems:  


(1) Anxiety disorder


(2) Major depressive disorder, recurrent episode











TERA JAY MD                  Oct 4, 2019 21:40

## 2019-10-04 NOTE — PN
DATE:  10/02/2019



PSYCHIATRIC PROGRESS NOTE



This late entry 10/02/2019 covers the elements not covered in my initial note.



SUBJECTIVE:  I met with the patient in the evening.  Per report from NIGEL Arechiga, the patient had a good day.  She received Tylenol and Klonopin for anxiety

at 9:54 a.m., slept 7-1/2 hours previous night.



REVIEW OF SYSTEMS:  No CV, , pulmonary, eye, ENT system symptoms on review.



MENTAL STATUS EXAM:  Reasonably oriented.  Speech is coherent, abstraction fair,

computation impaired, language function intact, attention span short.  Mood and

affect much brighter.  No suicidal ideation, more verbal, interactive as I met

with her.



LABORATORY DATA:  Reviewed.



IMPRESSION:  Unchanged from initial note.



PLAN:  No change from initial note.





______________________________

MAN BRIAN JAY MD



DR:  JAYLEEN/ted  JOB#:  [id number] / [id number]

DD:  10/04/2019 13:11  DT:  10/04/2019 20:33

## 2019-10-05 VITALS — SYSTOLIC BLOOD PRESSURE: 115 MMHG | DIASTOLIC BLOOD PRESSURE: 78 MMHG

## 2019-10-05 RX ADMIN — BUPROPION HYDROCHLORIDE SCH MG: 300 TABLET, EXTENDED RELEASE ORAL at 08:24

## 2019-10-05 RX ADMIN — PANTOPRAZOLE SODIUM SCH MG: 40 TABLET, DELAYED RELEASE ORAL at 08:24

## 2019-10-05 NOTE — DS
DATE OF DISCHARGE:  10/05/2019



DISCHARGE SUMMARY/PSYCHIATRIC PROGRESS NOTE



This note covers elements not covered in my initial note of 10/05/2019.



REASON FOR ADMISSION:  Please refer to the admission history for details. 

Briefly, the patient is a 63-year-old  female referred to us from the

ICU at the [facility] where she presented from home on account of

worsening symptoms of depression.  She has been aggressive towards the spouse of

the home, refusing to work with staff at the facility, wanting to be left alone,

lying in urine, oblivious of what was happening, extremely despondent, hopeless,

helpless, worthless with significant weight loss, refusing to eat, believing she

was not capable of taking care of herself, refusing to get out of bed.  This is

despite very intact cognition.  She had failed outpatient psychiatric

interventions, referred for inpatient psychiatric stabilization because of her

self-destructive behavior.



SIGNIFICANT FINDINGS AND CLINICAL COURSE:  Following admission, the patient was

seen daily individually by myself from a psychiatric standpoint, medical

followup per Dr. Rudolph.  The patient was extremely despondent, depressed,

hopeless, worthless with poor appetite.  Adjustments were made in her

psychotropics.  She is extremely obsessive and seemed to respond to a

combination of Luvox 75 mg a day, Remeron 15 mg at bedtime, trazodone 50 mg at

bedtime, Wellbutrin- mg daily, Abilify 5 mg at bedtime to augment the

antidepressant and Klonopin 0.25 mg t.i.d. p.r.n. anxiety.  Prior to discharge

on 10/05/2019, I met with her in the morning of 10/05/2019.



REVIEW OF SYSTEMS:  No CV, , pulmonary, eye, ENT system symptoms on review.



MENTAL STATUS EXAM:  The patient is reasonably oriented.  Speech is coherent,

has some latency.  Abstraction fair, computation impaired, language function

intact, attention span short.  Mood and affect is improved.  No suicidal or

homicidal ideation.  Review of her history had revealed that her son hung

himself in the home where he lived with the parents and the grandson was still

living in the home.  The patient had been extremely despondent and this was one

of the psychosocial stressors including in addition to the deteriorating health

of her .  She appeared to do quite well prior to discharge.



CONDITION AT DISCHARGE:  Improved.



FINAL DIAGNOSES:  Major depressive disorder, recurrent, severe with suicidal

ideation, in remission; mild cognitive impairment; anxiety disorder,

unspecified; obsessive-compulsive disorder.  Rest unchanged from admission.



DISCHARGE MEDICATIONS:  Please refer to the [facility].



DISCHARGE INSTRUCTIONS:  Outpatient psychiatric and medical followup as arranged

prior to discharge.



Time for discharge day management greater than 30 minutes.





______________________________

MAN BRIAN JAY MD



DR:  JAYLEEN/ted  JOB#:  [id number] / [id number]

DD:  10/05/2019 14:53  DT:  10/05/2019 17:19

## 2019-10-05 NOTE — PN
DATE:  10/04/2019



PSYCHIATRIC PROGRESS NOTE



This late entry 10/04/2019 covers the elements not covered in my initial note.



SUBJECTIVE:  I met with the patient in the evening of 10/04/2019 in her room. 

Per NIGEL Arechiga, the patient slept 6-1/2 hours previous night.  She has had a

good day, less withdrawn, less depressed.  Received Tylenol and Klonopin at 9:44

a.m. for anxiety.



REVIEW OF SYSTEMS:  Some impaired ambulation, but she ambulates on her own.  No

CV, , pulmonary, eye system symptoms on review.



MENTAL STATUS EXAMINATION:  The patient is reasonably oriented.  Speech is

coherent, abstraction fair, computation impaired, language function intact. 

Mood and affect less withdrawn, less depressed.



LABORATORY DATA:  Reviewed.



IMPRESSION:  Unchanged from initial note.



PLAN:  No change from initial note and discharge to outpatient treatment,

10/05/2019.





______________________________

MAN BRIAN JAY MD



DR:  JAYLEEN/ted  JOB#:  [id number] / [id number]

DD:  10/05/2019 14:49  DT:  10/05/2019 20:38

## 2019-10-05 NOTE — PDOC
Exam


Note:


Ashish Note:


Please also refer to the separate dictated note~for this date of service 

dictated separately.~Patient seen individually. Discussed the patient with 

Nursing staff reviewed the chart.~Reviewed interim history and current 

functioning. Reviewed vital signs,~Labs/ Radiology~and current medications noted

below. Continue current treatment with the changes noted in the dictated 

addendum note





Assessment:


Vital Signs/I&O:





                                   Vital Signs








  Date Time  Temp Pulse Resp B/P (MAP) Pulse Ox O2 Delivery O2 Flow Rate FiO2


 


10/5/19 05:54 97.8 100 18 115/78 (90) 97 Room Air  














                                    I & O   


 


 10/4/19 10/4/19 10/5/19





 15:00 23:00 07:00


 


Intake Total 480 ml 600 ml 


 


Balance 480 ml 600 ml 











Current Medications:


I have reviewed the current psychotropics carefully including drug interactions.

 Risk benefit ratio favors no change other than as noted in my dictated progress

note.





Diagnosis:


Problems:  


(1) Anxiety disorder


(2) Major depressive disorder, recurrent episode


(3) Hypokalemia


(4) Acute kidney injury


(5) Hypomagnesemia











TERA JAY MD                  Oct 5, 2019 21:47

## 2019-10-09 NOTE — CONS
DATE OF CONSULTATION:  10/03/2019



REFERRING PHYSICIAN:  Dr. Mcleod.



REASON FOR CONSULTATION:  Numbness and tingling of the legs.



HISTORY OF PRESENT ILLNESS:  This is a 63-year-old right-handed  female

who was initially admitted to Emergency Room on 09/12/2019 after she was found

to be dehydrated and hypertensive with elevated lactic acid.  The patient was

transferred to ICU for further medical care.  After she got improved, she went

back to [facility] for [facility] evaluation.  The patient was admitted

for increased symptoms of depression including lack of appetite, weight loss,

refusing medications, refusing food and not drinking enough, that she felt

hopeless and helpless.  Apparently, she lives with her  at home and she

has been suffering of depression for 3 years after her only one son hung himself

at home.  Since that time, she has been suffering from depression.  Neuro

consult was requested because the patient has had numbness and weakness of the

lower extremities of 2-month duration.  She reported recent fall out of the

chair and landed on her face and resulted in right blue eye.  She denies

headaches, visual disturbances, nausea, vomiting, chest pain, shortness of

breath or palpitations.  The patient denies lower back pain or radicular back

pain; however, she complains of intermittent numbness and paresthesia of the

feet.  She denies any recent back injuries.  She denies bowel or bladder

dysfunctions.



PAST MEDICAL HISTORY:  Significant for recent dehydration, elevated lactic acid,

hypertension, lactic acidosis, hypokalemia and hypomagnesemia.



PAST PSYCHIATRIC HISTORY:  Consistent with depression as described above.



SOCIAL HISTORY:  She lives with her  at home.  She denies drinking,

smoking, or illicit drug use; however, she has long history of narcotic abuse in

the past because of pain of the left hip.



FAMILY HISTORY:  Noncontributory.



CURRENT HOME MEDICATIONS:  Tylenol 650 q.4 hours, mirtazapine 50 mg daily,

sertraline 100 mg daily, trazodone 50 mg daily, Protonix 40 mg once daily.



ALLERGIES:  PENICILLIN.



REVIEW OF SYSTEMS:  A 10-point review of system was performed as mentioned above

in history of present illness.



PHYSICAL EXAMINATION:

GENERAL:  Well-developed, well-nourished female, not in acute distress.  She

weighs 47.3 kilos.

VITAL SIGNS:  Blood pressure 101/65, respiratory rate 14, pulse is 85,

temperature 97.2, oxygen saturation 95% on room air.

HEENT:  Normocephalic, atraumatic, otherwise unremarkable.  The patient has

recent fall with right blue eye. 

NECK:  Supple.  Negative for carotid bruit, lymphadenopathy or thyromegaly.

LUNGS:  Clear to A and P.

CARDIOVASCULAR:  Regular rate and rhythm, normal S1, S2.  There is no S3, S4 or

murmur.

ABDOMEN:  Soft.  Bowel sounds positive.

EXTREMITIES:  Negative for cyanosis, clubbing, or pitting edema.

NEUROLOGIC:  The patient is alert and oriented x 3.  The speech is fluent. 

There is no language dysfunction.  Memory, judgment, and abstract thinking are

fair.  The patient denies hallucination or delusion.

CRANIAL NERVES:  Visual fields are full.  The pupils are reactive to light and

accommodation.  The extraocular movements are intact.  There is no nystagmus. 

There is no facial motor or sensory deficit.  Hearing is intact bilaterally. 

The palate is elevated symmetrically.  Sternocleidomastoid muscles are powerful

bilaterally.  The patient shrugs her shoulders symmetrically, protrudes her

tongue in the midline without fasciculation or atrophy.

MOTOR:  No focal muscle bulk was seen.  The tone is normal.  The strength is 5/5

throughout.

SENSORY:  Revealed normal pinprick, light touch, vibratory and position senses. 

#5, deep tendon reflexes were symmetric and active with absent Achilles

responses.  GAIT:  The stance is steady.  Romberg sign is negative.  The patient

uses a wheelchair for safety.



LABORATORY DATA:  From 09/28/2019 revealed white blood cells of 4.6, hemoglobin

12.8, hematocrit 37.8, platelet count 328,000.  Chemistry revealed sodium of

141, potassium 4.1, chloride 104, CO2 of 24, BUN 11, creatinine 1 and glucose

101.  Calcium 9.2.  Liver enzymes are normal.



IMPRESSION:

1.  Numbness and paresthesia of the lower extremities of unknown etiology with

normal current neurological examination.

2.  Recent history of dehydration with hypokalemia and hypomagnesemia and

elevated lactic acidosis, which have improved.

3.  History of chronic obstructive pulmonary disease, left hip necrosis,

gastroesophageal reflux disease and degenerative joint disease, hyperlipidemia.

4.  Multiple psychiatric problems consistent with depression.



RECOMMENDATIONS:

1.  Continue with current medical and psychiatric care.

2.  Multivitamins.

3.  The patient needs EMG/NCS of the lower extremities to rule out

polyneuropathy versus lumbosacral radiculopathy or entrapment neuropathy in the

lower extremities.





______________________________

M NHAN FLORES MD



DR:  LOIDA/ted  JOB#:  [id number] / [id number]

DD:  10/08/2019 21:59  DT:  10/09/2019 19:33